# Patient Record
Sex: MALE | Race: WHITE | NOT HISPANIC OR LATINO | Employment: FULL TIME | URBAN - METROPOLITAN AREA
[De-identification: names, ages, dates, MRNs, and addresses within clinical notes are randomized per-mention and may not be internally consistent; named-entity substitution may affect disease eponyms.]

---

## 2017-01-25 ENCOUNTER — TELEPHONE (OUTPATIENT)
Dept: TRANSPLANT | Facility: CLINIC | Age: 32
End: 2017-01-25

## 2017-01-25 DIAGNOSIS — Z00.5 TRANSPLANT DONOR EVALUATION: Primary | ICD-10-CM

## 2017-01-25 NOTE — TELEPHONE ENCOUNTER
"Logged in as: [email or web address]. Logout.  Incomplete   Pending   Registered   Archived Change Log Done Living Kidney Donor Evaluation Completed: 2017 21:08:44 CT Updated: 2017 21:09:10 CT  Donor Name: Bean Huynh MRN: [id number] Note: : 1985 Age: 31Gender: Male Donor Height: 5  10\" Weight (lb): 200 BMI: 28.7  Donor Race: ,  Ethnicity: Not / Donor Preferred Language: English  Required?: No Current Marital Status: Single  Demographics: Home Address: [address] [place] [address] [address] City: [place] State: [place] Zip: [address] Country: [place]  Best Phone: [phone] Alt Phone: Donor Email: Best Phone Type: Mobile Alt Phone Type:   Preferred Contact Time(s): 09:00 AM-11:00 AM, 11:00 AM-1:00 PM, 1:00 PM-4:00 PM Preferred Contact Day(s): Mon, Tue, Wed, Thur, Fri  Donor Screen: PASSED Donor Referred by: Tx Candidate Donor self reported ABO: Unknown  Recipient Information: Recipient Name (Last, First): Janell Torres Recipient :    1988  ... Donor Relationship: Significant other Recipient Diagnosis: Recipient ABO:   MEDICAL HISTORY:  None Reported  MEDICATIONS:  None Reported  SURGICAL HISTORY:  None Reported  ALLERGIES:  NKDA  SOCIAL HISTORY:  EtOH: Occasional (1-2 drinks/week)  Illicit Drug Use: Denies  Tobacco: Remote; Quit ; ( ppd x 7 years)  SELF-REPORTED FUNCTIONAL STATUS:  \"I am able to participate in strenuous sports such as swimming, singles tennis, football, basketball, or skiing\"  Exercise (2 X per week)  REVIEW OF ORGAN SYSTEMS: Airway or Lungs: No Blood Disorder: No Cancer: No Diabetes,Thyroid,Adrenal,Endocrine Disorder: No Digestive or Liver: No Heart or Circulatory System: No Immune Diseases: No Kidneys and Bladder: No Male Health: No Muscles,Bones,Joints: No Neuro: No Psych: No  FAMILY HISTORY: Confirmed:  Cancer (Aunt or Uncle)  Diabetes (Aunt or Uncle, Grandparent)  Heart Disease (Grandparent)  Hypertension (Father, Grandparent, Aunt or " Uncle)  Denied:  Kidney Disease (denies)  Kidney Stones (denies)  DONOR INFORMATION:  Level of Education: Associate's or technical degree complete Employment Status: Full Time Employer: [employer] Medical Insurance Status: Has medical insurance Current Accommodation: Lives in rented accommodation Living Arrangement: With spouse Allow Disclosure to Recipient: Yes Paired Kidney Exchange Education Level: General idea Paired Kidney Exchange Participation Consent: Unsure Donor Motivation: Ready to start evaluation with reservations  HIGH RISK BEHAVIOR:  Blood transfusion < 12 months. (NO)  Commercial sex < 12 months. (NO)  Illicit IV drug use < 5yrs. (NO)  Male:male sexual contact < 5yrs. (NO)  Other high risk sexual contact < 12 months. (NO)  EMERGENCY CONTACT INFORMATION:  Primary Secondary First Name: Elin Last Name: Lino Phone Number: [phone] Relationship: Mother  First Name: Lance  Last Name: Lino Phone Number: [phone] Relationship: Father  REASON FOR DONATION:   I am either blood type O or A according to my mother. I want to be able to know if I am a match in case of an emergency surgery needed for my recipient. Also, in the future if donors are not available, I am willing to donate if I am indeed a match.  PHYSICIAN CONTACT INFORMATION:  PCP  Name: [facility]   City: [place] State: [place]  Phone: [phone]  ADDITIONAL NOTES:   REVIEWED BY:    Leydi  TODAY'S DATE:   01/25/2017  ... Done  I am either blood type O or A according to my mother. I want to be able to know if I am a match in case of an emergency surgery needed for my recipient. Also, in the future if donors are not available, I am willing to donate if I am indeed a match.

## 2017-03-07 ENCOUNTER — DOCUMENTATION ONLY (OUTPATIENT)
Dept: TRANSPLANT | Facility: CLINIC | Age: 32
End: 2017-03-07

## 2017-03-07 DIAGNOSIS — Z00.5 TRANSPLANT DONOR EVALUATION: Primary | ICD-10-CM

## 2017-03-08 ENCOUNTER — ALLIED HEALTH/NURSE VISIT (OUTPATIENT)
Dept: TRANSPLANT | Facility: CLINIC | Age: 32
End: 2017-03-08

## 2017-03-08 VITALS — HEIGHT: 70 IN | WEIGHT: 223.3 LBS | SYSTOLIC BLOOD PRESSURE: 128 MMHG | DIASTOLIC BLOOD PRESSURE: 80 MMHG

## 2017-03-08 DIAGNOSIS — Z00.5 TRANSPLANT DONOR EVALUATION: ICD-10-CM

## 2017-03-08 DIAGNOSIS — Z00.5 TRANSPLANT DONOR EVALUATION: Primary | ICD-10-CM

## 2017-03-08 LAB
ABO + RH BLD: NORMAL
ABO + RH BLD: NORMAL
ALBUMIN UR-MCNC: NEGATIVE MG/DL
APPEARANCE UR: CLEAR
BILIRUB UR QL STRIP: NEGATIVE
COLOR UR AUTO: YELLOW
CREAT SERPL-MCNC: 0.84 MG/DL (ref 0.66–1.25)
CREAT UR-MCNC: 269 MG/DL
GFR SERPL CREATININE-BSD FRML MDRD: NORMAL ML/MIN/1.7M2
GLUCOSE SERPL-MCNC: 98 MG/DL (ref 70–99)
GLUCOSE UR STRIP-MCNC: NEGATIVE MG/DL
HGB BLD-MCNC: 16.7 G/DL (ref 13.3–17.7)
HGB UR QL STRIP: NEGATIVE
KETONES UR STRIP-MCNC: NEGATIVE MG/DL
LEUKOCYTE ESTERASE UR QL STRIP: NEGATIVE
MICROALBUMIN UR-MCNC: 17 MG/L
MICROALBUMIN/CREAT UR: 6.28 MG/G CR (ref 0–17)
MUCOUS THREADS #/AREA URNS LPF: PRESENT /LPF
NITRATE UR QL: NEGATIVE
PH UR STRIP: 6 PH (ref 5–7)
PROT UR-MCNC: 0.23 G/L
PROT/CREAT 24H UR: 0.09 G/G CR (ref 0–0.2)
RBC #/AREA URNS AUTO: 1 /HPF (ref 0–2)
SP GR UR STRIP: 1.02 (ref 1–1.03)
SPECIMEN EXP DATE BLD: NORMAL
URN SPEC COLLECT METH UR: ABNORMAL
UROBILINOGEN UR STRIP-MCNC: 0 MG/DL (ref 0–2)
WBC #/AREA URNS AUTO: <1 /HPF (ref 0–2)

## 2017-03-08 PROCEDURE — 82565 ASSAY OF CREATININE: CPT | Performed by: TRANSPLANT SURGERY

## 2017-03-08 PROCEDURE — 82947 ASSAY GLUCOSE BLOOD QUANT: CPT | Performed by: TRANSPLANT SURGERY

## 2017-03-08 PROCEDURE — 84156 ASSAY OF PROTEIN URINE: CPT | Performed by: TRANSPLANT SURGERY

## 2017-03-08 PROCEDURE — 85018 HEMOGLOBIN: CPT | Performed by: TRANSPLANT SURGERY

## 2017-03-08 PROCEDURE — 81001 URINALYSIS AUTO W/SCOPE: CPT | Performed by: TRANSPLANT SURGERY

## 2017-03-08 PROCEDURE — 82043 UR ALBUMIN QUANTITATIVE: CPT | Performed by: TRANSPLANT SURGERY

## 2017-03-08 PROCEDURE — 36415 COLL VENOUS BLD VENIPUNCTURE: CPT | Performed by: TRANSPLANT SURGERY

## 2017-03-08 PROCEDURE — 86900 BLOOD TYPING SEROLOGIC ABO: CPT | Performed by: TRANSPLANT SURGERY

## 2017-03-08 NOTE — MR AVS SNAPSHOT
"              After Visit Summary   3/8/2017    Bean Huynh    MRN: [id number]           Patient Information     Date Of Birth          1985        Visit Information        Provider Department      3/8/2017 9:30 AM Nurse, [facility] Solid Organ Transplant        Today's Diagnoses     Transplant donor evaluation    -  1       Follow-ups after your visit        Who to contact     If you have questions or need follow up information about today's clinic visit or your schedule please contact [facility] SOLID ORGAN TRANSPLANT directly at [phone].  Normal or non-critical lab and imaging results will be communicated to you by [facility], letter or phone within 4 business days after the clinic has received the results. If you do not hear from us within 7 days, please contact the clinic through [facility] or phone. If you have a critical or abnormal lab result, we will notify you by phone as soon as possible.  Submit refill requests through [employer] or call your pharmacy and they will forward the refill request to us. Please allow 3 business days for your refill to be completed.          Additional Information About Your Visit        MyChart Information     Plurchase lets you send messages to your doctor, view your test results, renew your prescriptions, schedule appointments and more. To sign up, go to [email or web address] . Click on \"Log in\" on the left side of the screen, which will take you to the Welcome page. Then click on \"Sign up Now\" on the right side of the page.     You will be asked to enter the access code listed below, as well as some personal information. Please follow the directions to create your username and password.     Your access code is: [email or web address]  Expires: 2017 11:32 AM     Your access code will  in 90 days. If you need help or a new code, please call your [facility] clinic or [phone].        Care EveryWhere ID     This is your Care EveryWhere ID. This could be used by other " "organizations to access your [facility] medical records  [id number]        Your Vitals Were     Height BMI (Body Mass Index)                1.778 m (5' 10\") 32.04 kg/m2           Blood Pressure from Last 3 Encounters:   03/08/17 128/80   06/28/16 124/82   11/30/15 138/80    Weight from Last 3 Encounters:   03/08/17 101.3 kg (223 lb 4.8 oz)   06/28/16 98.9 kg (218 lb)   11/30/15 100.8 kg (222 lb 3.2 oz)              Today, you had the following     No orders found for display       Primary Care Provider Office Phone # Fax #    Piotr Zabala [phone]       [facility] [address]  [facility] [address]        Thank you!     Thank you for choosing [facility] SOLID ORGAN TRANSPLANT  for your care. Our goal is always to provide you with excellent care. Hearing back from our patients is one way we can continue to improve our services. Please take a few minutes to complete the written survey that you may receive in the mail after your visit with us. Thank you!             Your Updated Medication List - Protect others around you: Learn how to safely use, store and throw away your medicines at www.disposemymeds.org.          This list is accurate as of: 3/8/17 11:32 AM.  Always use your most recent med list.                   Brand Name Dispense Instructions for use    doxycycline 100 MG tablet    VIBRA-TABS    14 tablet    Take 1 tablet (100 mg) by mouth 2 times daily       guaiFENesin-codeine 100-10 MG/5ML Soln solution    ROBITUSSIN AC    120 mL    Take 10 mLs by mouth every 4 hours as needed for cough       * nystatin 041706 UNIT/GM Powd    MYCOSTATIN    30 g    Apply to affected area of groin 2 times daily       * nystatin 010775 UNIT/GM Powd    MYCOSTATIN    60 g    Apply topically 3 times daily as needed       * Notice:  This list has 2 medication(s) that are the same as other medications prescribed for you. Read the directions carefully, and ask your doctor or other care provider " to review them with you.

## 2017-03-09 ENCOUNTER — TELEPHONE (OUTPATIENT)
Dept: TRANSPLANT | Facility: CLINIC | Age: 32
End: 2017-03-09

## 2017-03-09 NOTE — TELEPHONE ENCOUNTER
Left message asking Bean to return call. Phase 1's all OK. Is abo incompat--need to rediscuss PEP.

## 2017-03-09 NOTE — TELEPHONE ENCOUNTER
Informed Bean he's abo incompat. Will now speak with SO/tabby and see if she's interested in doing the PEP.Will contact me either way.

## 2017-04-10 ENCOUNTER — TELEPHONE (OUTPATIENT)
Dept: TRANSPLANT | Facility: CLINIC | Age: 32
End: 2017-04-10

## 2017-04-10 NOTE — TELEPHONE ENCOUNTER
Has decided to do PEP. Will now get sched for eval. [place] left [place] in past 3 months.

## 2017-04-20 DIAGNOSIS — Z00.5 TRANSPLANT DONOR EVALUATION: ICD-10-CM

## 2017-04-27 ENCOUNTER — RADIANT APPOINTMENT (OUTPATIENT)
Dept: CT IMAGING | Facility: CLINIC | Age: 32
End: 2017-04-27
Attending: INTERNAL MEDICINE

## 2017-04-27 ENCOUNTER — ALLIED HEALTH/NURSE VISIT (OUTPATIENT)
Dept: TRANSPLANT | Facility: CLINIC | Age: 32
End: 2017-04-27
Attending: SURGERY

## 2017-04-27 ENCOUNTER — OFFICE VISIT (OUTPATIENT)
Dept: TRANSPLANT | Facility: CLINIC | Age: 32
End: 2017-04-27
Attending: SURGERY

## 2017-04-27 ENCOUNTER — OFFICE VISIT (OUTPATIENT)
Dept: NEPHROLOGY | Facility: CLINIC | Age: 32
End: 2017-04-27
Attending: INTERNAL MEDICINE
Payer: COMMERCIAL

## 2017-04-27 ENCOUNTER — INFUSION THERAPY VISIT (OUTPATIENT)
Dept: INFUSION THERAPY | Facility: CLINIC | Age: 32
End: 2017-04-27
Attending: INTERNAL MEDICINE

## 2017-04-27 VITALS
HEART RATE: 85 BPM | DIASTOLIC BLOOD PRESSURE: 74 MMHG | BODY MASS INDEX: 32.1 KG/M2 | WEIGHT: 224.2 LBS | TEMPERATURE: 98.3 F | RESPIRATION RATE: 16 BRPM | SYSTOLIC BLOOD PRESSURE: 116 MMHG | OXYGEN SATURATION: 97 % | HEIGHT: 70 IN

## 2017-04-27 VITALS — SYSTOLIC BLOOD PRESSURE: 130 MMHG | DIASTOLIC BLOOD PRESSURE: 80 MMHG

## 2017-04-27 VITALS — DIASTOLIC BLOOD PRESSURE: 78 MMHG | SYSTOLIC BLOOD PRESSURE: 128 MMHG

## 2017-04-27 DIAGNOSIS — Z00.5 TRANSPLANT DONOR EVALUATION: ICD-10-CM

## 2017-04-27 DIAGNOSIS — Z00.5 EXAMINATION OF POTENTIAL DONOR OF ORGAN AND TISSUE: Primary | ICD-10-CM

## 2017-04-27 DIAGNOSIS — Z00.5 TRANSPLANT DONOR EVALUATION: Primary | ICD-10-CM

## 2017-04-27 LAB
ABO + RH BLD: NORMAL
ABO + RH BLD: NORMAL
ALBUMIN SERPL-MCNC: 4 G/DL (ref 3.4–5)
ALBUMIN UR-MCNC: NEGATIVE MG/DL
ALP SERPL-CCNC: 70 U/L (ref 40–150)
ALT SERPL W P-5'-P-CCNC: 50 U/L (ref 0–70)
ANION GAP SERPL CALCULATED.3IONS-SCNC: 7 MMOL/L (ref 3–14)
APPEARANCE UR: CLEAR
APTT PPP: 31 SEC (ref 22–37)
AST SERPL W P-5'-P-CCNC: 27 U/L (ref 0–45)
BILIRUB SERPL-MCNC: 1.2 MG/DL (ref 0.2–1.3)
BILIRUB UR QL STRIP: NEGATIVE
BLD GP AB SCN SERPL QL: NORMAL
BLOOD BANK CMNT PATIENT-IMP: NORMAL
BUN SERPL-MCNC: 15 MG/DL (ref 7–30)
CALCIUM SERPL-MCNC: 8.6 MG/DL (ref 8.5–10.1)
CHLORIDE SERPL-SCNC: 104 MMOL/L (ref 94–109)
CHOLEST SERPL-MCNC: 203 MG/DL
CMV IGG SERPL QL IA: 5.7 AI (ref 0–0.8)
CO2 SERPL-SCNC: 26 MMOL/L (ref 20–32)
COLOR UR AUTO: YELLOW
CREAT SERPL-MCNC: 0.88 MG/DL (ref 0.66–1.25)
CREAT UR-MCNC: 295 MG/DL
EBV VCA IGG SER QL IA: 2.8 AI (ref 0–0.8)
EBV VCA IGM SER QL IA: 0.2 AI (ref 0–0.8)
ERYTHROCYTE [DISTWIDTH] IN BLOOD BY AUTOMATED COUNT: 11.9 % (ref 10–15)
GFR SERPL CREATININE-BSD FRML MDRD: NORMAL ML/MIN/1.7M2
GLUCOSE SERPL-MCNC: 88 MG/DL (ref 70–99)
GLUCOSE UR STRIP-MCNC: NEGATIVE MG/DL
HBA1C MFR BLD: 4.8 % (ref 4.3–6)
HBV CORE AB SERPL QL IA: NONREACTIVE
HBV SURFACE AB SERPL IA-ACNC: 54.58 M[IU]/ML
HBV SURFACE AG SERPL QL IA: NONREACTIVE
HCT VFR BLD AUTO: 46.6 % (ref 40–53)
HCV AB SERPL QL IA: NORMAL
HDLC SERPL-MCNC: 41 MG/DL
HGB BLD-MCNC: 16.2 G/DL (ref 13.3–17.7)
HGB UR QL STRIP: NEGATIVE
HIV 1+2 AB+HIV1 P24 AG SERPL QL IA: NORMAL
INR PPP: 0.96 (ref 0.86–1.14)
KETONES UR STRIP-MCNC: NEGATIVE MG/DL
LDLC SERPL CALC-MCNC: 120 MG/DL
LEUKOCYTE ESTERASE UR QL STRIP: NEGATIVE
MCH RBC QN AUTO: 31.9 PG (ref 26.5–33)
MCHC RBC AUTO-ENTMCNC: 34.8 G/DL (ref 31.5–36.5)
MCV RBC AUTO: 92 FL (ref 78–100)
MICROALBUMIN UR-MCNC: 17 MG/L
MICROALBUMIN/CREAT UR: 5.8 MG/G CR (ref 0–17)
MUCOUS THREADS #/AREA URNS LPF: PRESENT /LPF
NITRATE UR QL: NEGATIVE
NONHDLC SERPL-MCNC: 162 MG/DL
PH UR STRIP: 6 PH (ref 5–7)
PHOSPHATE SERPL-MCNC: 2.9 MG/DL (ref 2.5–4.5)
PLATELET # BLD AUTO: 201 10E9/L (ref 150–450)
POTASSIUM SERPL-SCNC: 4.1 MMOL/L (ref 3.4–5.3)
PROT SERPL-MCNC: 7.7 G/DL (ref 6.8–8.8)
PROT UR-MCNC: 0.22 G/L
PROT/CREAT 24H UR: 0.08 G/G CR (ref 0–0.2)
RBC # BLD AUTO: 5.08 10E12/L (ref 4.4–5.9)
RBC #/AREA URNS AUTO: 1 /HPF (ref 0–2)
SODIUM SERPL-SCNC: 136 MMOL/L (ref 133–144)
SP GR UR STRIP: 1.02 (ref 1–1.03)
SPECIMEN EXP DATE BLD: NORMAL
T PALLIDUM IGG+IGM SER QL: NEGATIVE
TRIGL SERPL-MCNC: 211 MG/DL
URATE SERPL-MCNC: 6.6 MG/DL (ref 3.5–7.2)
URN SPEC COLLECT METH UR: ABNORMAL
UROBILINOGEN UR STRIP-MCNC: 0 MG/DL (ref 0–2)
WBC # BLD AUTO: 6.6 10E9/L (ref 4–11)
WBC #/AREA URNS AUTO: 1 /HPF (ref 0–2)

## 2017-04-27 PROCEDURE — 86706 HEP B SURFACE ANTIBODY: CPT | Performed by: INTERNAL MEDICINE

## 2017-04-27 PROCEDURE — 80053 COMPREHEN METABOLIC PANEL: CPT | Performed by: INTERNAL MEDICINE

## 2017-04-27 PROCEDURE — 80061 LIPID PANEL: CPT | Performed by: INTERNAL MEDICINE

## 2017-04-27 PROCEDURE — 84550 ASSAY OF BLOOD/URIC ACID: CPT | Performed by: INTERNAL MEDICINE

## 2017-04-27 PROCEDURE — 86480 TB TEST CELL IMMUN MEASURE: CPT | Performed by: INTERNAL MEDICINE

## 2017-04-27 PROCEDURE — 85027 COMPLETE CBC AUTOMATED: CPT | Performed by: INTERNAL MEDICINE

## 2017-04-27 PROCEDURE — 85610 PROTHROMBIN TIME: CPT | Performed by: INTERNAL MEDICINE

## 2017-04-27 PROCEDURE — 86665 EPSTEIN-BARR CAPSID VCA: CPT | Performed by: INTERNAL MEDICINE

## 2017-04-27 PROCEDURE — 40000866 ZZHCL STATISTIC HIV 1/2 ANTIGEN/ANTIBODY PRETRANSPLANT ONLY: Performed by: INTERNAL MEDICINE

## 2017-04-27 PROCEDURE — 81001 URINALYSIS AUTO W/SCOPE: CPT | Performed by: INTERNAL MEDICINE

## 2017-04-27 PROCEDURE — 85730 THROMBOPLASTIN TIME PARTIAL: CPT | Performed by: INTERNAL MEDICINE

## 2017-04-27 PROCEDURE — 82542 COL CHROMOTOGRAPHY QUAL/QUAN: CPT | Performed by: INTERNAL MEDICINE

## 2017-04-27 PROCEDURE — 86780 TREPONEMA PALLIDUM: CPT | Performed by: INTERNAL MEDICINE

## 2017-04-27 PROCEDURE — 96374 THER/PROPH/DIAG INJ IV PUSH: CPT

## 2017-04-27 PROCEDURE — 84156 ASSAY OF PROTEIN URINE: CPT | Performed by: INTERNAL MEDICINE

## 2017-04-27 PROCEDURE — 83036 HEMOGLOBIN GLYCOSYLATED A1C: CPT | Performed by: INTERNAL MEDICINE

## 2017-04-27 PROCEDURE — 86901 BLOOD TYPING SEROLOGIC RH(D): CPT | Performed by: INTERNAL MEDICINE

## 2017-04-27 PROCEDURE — 84100 ASSAY OF PHOSPHORUS: CPT | Performed by: INTERNAL MEDICINE

## 2017-04-27 PROCEDURE — 86803 HEPATITIS C AB TEST: CPT | Performed by: INTERNAL MEDICINE

## 2017-04-27 PROCEDURE — 86704 HEP B CORE ANTIBODY TOTAL: CPT | Performed by: INTERNAL MEDICINE

## 2017-04-27 PROCEDURE — 86905 BLOOD TYPING RBC ANTIGENS: CPT | Performed by: INTERNAL MEDICINE

## 2017-04-27 PROCEDURE — 25500064 ZZH RX 255 OP 636: Performed by: INTERNAL MEDICINE

## 2017-04-27 PROCEDURE — 87340 HEPATITIS B SURFACE AG IA: CPT | Performed by: INTERNAL MEDICINE

## 2017-04-27 PROCEDURE — 86644 CMV ANTIBODY: CPT | Performed by: INTERNAL MEDICINE

## 2017-04-27 PROCEDURE — 93010 ELECTROCARDIOGRAM REPORT: CPT | Performed by: INTERNAL MEDICINE

## 2017-04-27 PROCEDURE — 36415 COLL VENOUS BLD VENIPUNCTURE: CPT | Performed by: INTERNAL MEDICINE

## 2017-04-27 PROCEDURE — 82043 UR ALBUMIN QUANTITATIVE: CPT | Performed by: INTERNAL MEDICINE

## 2017-04-27 PROCEDURE — 86850 RBC ANTIBODY SCREEN: CPT | Performed by: INTERNAL MEDICINE

## 2017-04-27 PROCEDURE — 86900 BLOOD TYPING SEROLOGIC ABO: CPT | Performed by: INTERNAL MEDICINE

## 2017-04-27 RX ORDER — IOPAMIDOL 755 MG/ML
100 INJECTION, SOLUTION INTRAVASCULAR ONCE
Status: COMPLETED | OUTPATIENT
Start: 2017-04-27 | End: 2017-04-27

## 2017-04-27 RX ADMIN — IOHEXOL 5 ML: 300 INJECTION, SOLUTION INTRAVENOUS at 09:17

## 2017-04-27 RX ADMIN — IOPAMIDOL 100 ML: 755 INJECTION, SOLUTION INTRAVASCULAR at 15:27

## 2017-04-27 ASSESSMENT — PAIN SCALES - GENERAL: PAINLEVEL: NO PAIN (0)

## 2017-04-27 NOTE — LETTER
4/27/2017       RE: Bean Huynh  [address]   [address]  [place] [address]     Dear Colleague,    Thank you for referring your patient, Bean Huynh, to the [facility] NEPHROLOGY at [facility]. Please see a copy of my visit note below.    Assessment and Plan:  1. Prospective kidney transplant donor with one issue that needs to be addressed prior to donation. Patient's blood pressure is acceptable at this visit, kidney function appears to be acceptable with Iohexol pending, and urinalysis is bland.  2. Overweight - patient is above BMI goal of 30 or less.  Recommend goal weight loss of 15 lbs to decrease risk of surgical wound complications and for his overall health.    Discussed the risks of donating a kidney, including the surgical risk and the possible risks of living with one kidney.    Education about expected post-donation kidney function and how chronic kidney disease (CKD) and end stage kidney disease (ESKD) might potentially impact the donor in the future, include, but not limited to:       - On average, donors will have 25-35% permanent loss of kidney function at donation.       - Baseline risk of ESKD may slightly exceed that of members of the general population with the same demographic profile.       - Donor risks must be interpreted in light of known epidemiology of both CKD or ESKD, such as that CKD generally develops in midlife (40-50 years old) and ESKD generally develops after age 60.       - Medical evaluation of young potential donors cannot predict lifetime risk of CKD or ESKD.       - Donors may be at higher risk for CKD if they sustain damage to the remaining kidney.       - Development of CKD and progression of ESKD may be more rapid with only 1 kidney.       - Some type of kidney replacement therapy, either kidney transplant or dialysis, is required when reaching ESKD.    Potential medical or surgical risks include, but not limited  to:       - Death.       - Scars, pain, fatigue, and other consequences typical of any surgical procedure.       - Decreased kidney function.       - Abdominal or bowel symptoms, such as bloating and nausea, and developing bowel obstruction.       - Kidney failure (ESKD) and the need for a kidney transplant or dialysis for the donor.       - Impact of obesity, hypertension, or other donor-specific medical conditions on morbidity and mortality of the potential donor.    Patients overall evaluation will be discussed with the transplant team and a final recommendation on the patients' suitability to be a kidney transplant donor will be made at that time.    Consult:  Bean Huynh was seen in consultation at the request of Dr. Linus Mckeon for evaluation as a potential kidney transplant donor.    Reason for Visit:  Bean Hyunh is a 31 year old male who presents for a kidney donor evaluation.  Patient would like to donate to Janell Torres, his girlfriend.    HPI:    Patient reports feeling good overall with no significant medical complaints.  His energy level is good and pretty much normal.  He is active and does get some exercise.  Denies any chest pain or shortness of breath with exertion.  Appetite is good with no nausea, vomiting or diarrhea.  No fever, sweats or chills.  No leg swelling.         Kidney Disease Hx:       h/o Kidney Problems: No  Family h/o Genetic Kidney Disease: No       h/o HTN: No    Usual BP: Not checked       h/o Protein in Urine: No  h/o Blood in Urine: No       h/o Kidney Stones: No  h/o Kidney Injury: No       h/o Recurrent UTI: No  h/o Genitourinary Problems: No       h/o Chronic NSAID Use: No         Other Medical Hx:       h/o DM: No   h/o Gestational DM: Not applicable       h/o Preeclampsia: Not applicable          h/o Gastrointestinal, Pancreas or Liver Problems: No       h/o Lung or Heart Problems: No       h/o Hematologic Problems: No  h/o Bleeding or Clotting Problems: No        h/o Cancer: No       h/o Infection Problems: No         Skin Cancer Risk:       h/o more than 50 moles: No       h/o extensive sun exposure: No       h/o melanoma: No       Family h/o melanoma: No         Mental Health Assessment:       h/o Depression: No       h/o Psychiatric Illness: No       h/o Suicidal Attempt(s): No    ROS:   A comprehensive review of systems was obtained and negative, except as noted in the HPI or PMH.    PMH:   History was taken from the patient as noted below.  Past Medical History:   Diagnosis Date     NO ACTIVE PROBLEMS        PSH:   Past Surgical History:   Procedure Laterality Date     s/p wisdom teeth extraction       Personal or family history of anesthesia problems: No    Family Hx:   Family History   Problem Relation Age of Onset     Hypertension Father      HEART DISEASE Maternal Grandmother      DIABETES Paternal Grandfather      DIABETES Paternal Aunt      Colon Cancer Maternal Uncle           Specific Family Hx:       FH of DM: No       FH of CAD: No  FH of HTN: Yes        FH of Cancer: No  FH of Kidney Cancer: No    Personal Hx:   Social History     Social History     Marital status: Single     Spouse name: N/A     Number of children: 0     Years of education: N/A     Occupational History     Not on file.     Social History Main Topics     Smoking status: Never Smoker     Smokeless tobacco: Never Used     Alcohol use 3.0 oz/week     5 Standard drinks or equivalent per week      Comment: social     Drug use: No     Sexual activity: Yes     Partners: Female     Birth control/ protection: Condom     Other Topics Concern     Parent/Sibling W/ Cabg, Mi Or Angioplasty Before 65f 55m? No     Social History Narrative          Specific Social Hx:       Health Insurance Status: Yes       Employment Status: Full time  Occupation: Senior engineering tech                       Living Arrangements: Lives with significant other       Social Support: Yes       Presence of increased risk for  "disease transmission behaviors as defined by [facility] guidelines: No        Allergies:  No Known Allergies    Medications:  Prior to Admission medications    Not on File       Vitals:  Vital Signs 4/27/2017 4/27/2017 4/27/2017   Systolic 116 130 128   Diastolic 74 80 78   Pulse 85 - -   Temperature 98.3 - -   Respirations 16 - -   Weight (LB) 224 lb 3.2 oz - -   Height 5' 10\" - -   BMI (Calculated) 32.24 - -   Pain - - -   O2 97 - -       Exam:   GENERAL APPEARANCE: alert and no distress  HENT: mouth without ulcers or lesions  LYMPHATICS: no cervical or supraclavicular nodes  RESP: lungs clear to auscultation - no rales, rhonchi or wheezes  CV: regular rhythm, normal rate, no rub, no murmur  EDEMA: no LE edema bilaterally  ABDOMEN: soft, nondistended, nontender, bowel sounds normal  MS: extremities normal - no gross deformities noted, no evidence of inflammation in joints, no muscle tenderness  SKIN: no rash    Results:   Labs and imaging were ordered for this visit and reviewed by me.  Recent Results (from the past 336 hour(s))   EKG 12-lead complete w/read - Clinics    Collection Time: 04/27/17  8:41 AM   Result Value Ref Range    Interpretation ECG Click View Image link to view waveform and result    ABO/Rh type and screen    Collection Time: 04/27/17  8:45 AM   Result Value Ref Range    ABO A     RH(D)  Neg     Antibody Screen Neg     Test Valid Only At       [facility]    Specimen Expires 04/30/2017    Hepatitis B core antibody    Collection Time: 04/27/17  8:45 AM   Result Value Ref Range    Hepatitis B Core Fabby Nonreactive NR   Hepatitis B Surface Antibody    Collection Time: 04/27/17  8:45 AM   Result Value Ref Range    Hepatitis B Surface Antibody 54.58 (H) <8.00 m[IU]/mL   Hepatitis B surface antigen    Collection Time: 04/27/17  8:45 AM   Result Value Ref Range    Hep B Surface Agn Nonreactive NR   Hepatitis C antibody    Collection Time: 04/27/17  8:45 AM   Result " Value Ref Range    Hepatitis C Antibody  NR     Nonreactive   Assay performance characteristics have not been established for newborns,   infants, and children     HIV Antigen Antibody Combo Pretransplant    Collection Time: 04/27/17  8:45 AM   Result Value Ref Range    HIV Antigen Antibody Combo Pretransplant  NR     Nonreactive   HIV-1 p24 Ag & HIV-1/HIV-2 Ab Not Detected     Anti Treponema    Collection Time: 04/27/17  8:45 AM   Result Value Ref Range    Treponema pallidum Antibody Negative NEG   ABO Subtyping [[id number]]    Collection Time: 04/27/17  8:45 AM   Result Value Ref Range    Antigen Type A1 Negative    Lipid Profile    Collection Time: 04/27/17  8:45 AM   Result Value Ref Range    Cholesterol 203 (H) <200 mg/dL    Triglycerides 211 (H) <150 mg/dL    HDL Cholesterol 41 >39 mg/dL    LDL Cholesterol Calculated 120 (H) <100 mg/dL    Non HDL Cholesterol 162 (H) <130 mg/dL   Comprehensive metabolic panel    Collection Time: 04/27/17  8:45 AM   Result Value Ref Range    Sodium 136 133 - 144 mmol/L    Potassium 4.1 3.4 - 5.3 mmol/L    Chloride 104 94 - 109 mmol/L    Carbon Dioxide 26 20 - 32 mmol/L    Anion Gap 7 3 - 14 mmol/L    Glucose 88 70 - 99 mg/dL    Urea Nitrogen 15 7 - 30 mg/dL    Creatinine 0.88 0.66 - 1.25 mg/dL    GFR Estimate >90  Non  GFR Calc   >60 mL/min/1.7m2    GFR Estimate If Black >90   GFR Calc   >60 mL/min/1.7m2    Calcium 8.6 8.5 - 10.1 mg/dL    Bilirubin Total 1.2 0.2 - 1.3 mg/dL    Albumin 4.0 3.4 - 5.0 g/dL    Protein Total 7.7 6.8 - 8.8 g/dL    Alkaline Phosphatase 70 40 - 150 U/L    ALT 50 0 - 70 U/L    AST 27 0 - 45 U/L   Phosphorus    Collection Time: 04/27/17  8:45 AM   Result Value Ref Range    Phosphorus 2.9 2.5 - 4.5 mg/dL   Hemoglobin A1c    Collection Time: 04/27/17  8:45 AM   Result Value Ref Range    Hemoglobin A1C 4.8 4.3 - 6.0 %   INR    Collection Time: 04/27/17  8:45 AM   Result Value Ref Range    INR 0.96 0.86 - 1.14   Partial  thromboplastin time    Collection Time: 04/27/17  8:45 AM   Result Value Ref Range    PTT 31 22 - 37 sec   CBC with platelets    Collection Time: 04/27/17  8:45 AM   Result Value Ref Range    WBC 6.6 4.0 - 11.0 10e9/L    RBC Count 5.08 4.4 - 5.9 10e12/L    Hemoglobin 16.2 13.3 - 17.7 g/dL    Hematocrit 46.6 40.0 - 53.0 %    MCV 92 78 - 100 fl    MCH 31.9 26.5 - 33.0 pg    MCHC 34.8 31.5 - 36.5 g/dL    RDW 11.9 10.0 - 15.0 %    Platelet Count 201 150 - 450 10e9/L   CMV Antibody IgG    Collection Time: 04/27/17  8:45 AM   Result Value Ref Range    CMV Antibody IgG 5.7 (H) 0.0 - 0.8 AI   EBV Capsid Antibody IgG    Collection Time: 04/27/17  8:45 AM   Result Value Ref Range    EBV Capsid Antibody IgG 2.8 (H) 0.0 - 0.8 AI   EBV Capsid Antibody IgM    Collection Time: 04/27/17  8:45 AM   Result Value Ref Range    EBV Capsid Antibody IgM 0.2 0.0 - 0.8 AI   M Tuberculosis by Quantiferon    Collection Time: 04/27/17  8:45 AM   Result Value Ref Range    M Tuberculosis Result Negative NEG    M Tuberculosis Antigen Value 0.02 IU/mL   Uric acid    Collection Time: 04/27/17  8:45 AM   Result Value Ref Range    Uric Acid 6.6 3.5 - 7.2 mg/dL   Blood Group A Subtype    Collection Time: 04/27/17  8:45 AM   Result Value Ref Range    Antigen Type Canceled, Test credited     Blood Bank Comment Duplicate request  4/27/17 1002 IF      Routine UA with microscopic    Collection Time: 04/27/17  8:48 AM   Result Value Ref Range    Color Urine Yellow     Appearance Urine Clear     Glucose Urine Negative NEG mg/dL    Bilirubin Urine Negative NEG    Ketones Urine Negative NEG mg/dL    Specific Gravity Urine 1.025 1.003 - 1.035    Blood Urine Negative NEG    pH Urine 6.0 5.0 - 7.0 pH    Protein Albumin Urine Negative NEG mg/dL    Urobilinogen mg/dL 0.0 0.0 - 2.0 mg/dL    Nitrite Urine Negative NEG    Leukocyte Esterase Urine Negative NEG    Source Midstream Urine     WBC Urine 1 0 - 2 /HPF    RBC Urine 1 0 - 2 /HPF    Mucous Urine Present (A) NEG  /LPF   Microalbumin quantitative random urine    Collection Time: 04/27/17  8:48 AM   Result Value Ref Range    Creatinine Urine 295 mg/dL    Albumin Urine mg/L 17 mg/L    Albumin Urine mg/g Cr 5.80 0 - 17 mg/g Cr   Protein  random urine    Collection Time: 04/27/17  8:48 AM   Result Value Ref Range    Protein Random Urine 0.22 g/L    Protein Total Urine g/gr Creatinine 0.08 0 - 0.2 g/g Cr   CT Renal angio    Collection Time: 04/27/17  3:37 PM   Result Value Ref Range    Radiologist flags Lesion in the left iliac body/wing, recommend MRI              Again, thank you for allowing me to participate in the care of your patient.      Sincerely,    Jose Perez MD

## 2017-04-27 NOTE — LETTER
4/27/2017       RE: Bean Huynh  [address]   [address]  [place] [address]     Dear Colleague,    Thank you for referring your patient, Bean Huynh, to the [facility] SOLID ORGAN TRANSPLANT [facility]. Please see a copy of my visit note below.    RE: Bean Huynh  [facility] #[id number]             I saw your patient, Bean Huynh, in consultation in our pretransplant clinic.  He was here at clinic for evaluation as a possible kidney donor to his girlfriend.  He had not yet seen our donor video.  Our donor coordinator shared our center-specific results with him.  We discussed:    (1) The risks of donation--including mortality (0.03% risk) and morbidity (approximately 1% risk of major morbidity).  We discussed the major reported causes of death after kidney donation (bleeding, infection, pulmonary embolism).  We discussed potential complications:  bleeding requiring reoperation, infection requiring reoperation, pneumonia, bowel obstruction, infection at the site of the incision, hernia at the site of the incision, deep vein thrombosis, deep vein thrombosis with associated pulmonary embolism, and urinary tract infection.  I told her I could not possibly name all of the risks, but that she was at risk for any complications that could occur in any operation.     We also discussed the long-term risks of living with one kidney.  We discussed the rare diseases that might affect only one kidney.  We talked about the new publications suggesting slightly increased long-term risk of ESRD after donor nephrectomy.  For people his age, there is really no data to show what the outcome will be at 50-60 years.  This is an important consideration.  We discussed the rare diseases that might affect having only one kidney.    (2) The hospital stay and the fact that if all goes well, discharge would occur within two to three days after donor nephrectomy.  We also discussed  the fact that there would be no diet or fluid restrictions (again if all went well), and that the only new medication that he would be on would be pain medication.  We discussed the fact that most patients are on Tylenol or something similar within five to six days of surgery.      (3) The recovery time after surgery and the restrictions that are necessary: a) no driving for a couple of weeks (or until he felt that his/her reaction would be adequate if he had to slam on the brakes; and b) no lifting over 10 pounds or any exercise that would stretch his abdominal muscles for six weeks (to allow the muscles to heal so that he doesn't develop a hernia).  We also discussed return to work, which could occur in approximately three to four weeks if he had a desk job or would require not returning to work for six weeks if the job required any heavy lifting or exercise.  We discussed the potential for not getting his pep and energy back for anywhere from two to six weeks after surgery and how there was a tremendous individual variation in return of full energy level.    (4) The concern about long distance travel for the first couple of weeks post-donation.  We discussed the risks of deep vein thrombosis associated with plane or car travel.  I recommended that, if flying, he should get up and walk around every 30-40 minutes; if driving he should ask the  to stop the car every 30-45 minutes so Mr. Huynh could take a short walk.    (5) The fact that the recipient could be on a waiting for  donor transplant and would ultimately receive a kidney if Mr. Huynh did not donate.      (6) The difference between nondirected and directed donors.  I explained our policy of allocation of nondirected kidneys to the number one person on the list.  I also explained that by being a nondirected donor, they would not have the opportunity to meet the recipient (at least for the first six months) and, therefore, would not have  "the opportunity to see the potential \"benefit\" of the donation.  I also mentioned that our recipients often have not sent \"thank-you\" letters to their nondirected donors.  This would be an important thing to recognize as he went forward in thinking about nondirected donation.  Finally, we discussed the option of entering the paired exchange pool--its advantage, increasing the number of transplants from the donation; the disadvantage, harder to schedule and more logistics.    Finally, he has a window in which he wants to donate. However, the transplant will be a paired exchange. I spent some time talking to him about the logistics may preclude him using the specific window he was interested in.    I attempted to answer any remaining questions.  I also told him that should he have any questions, he should feel free to contact us.  We would be glad to answer any questions either over the phone or at another clinic visit.  His transplant coordinator is Mercy Ventura and may be reached at [phone].  Thank you for the opportunity to see him.    I spent 30 minutes with this patient.  Over 95% of that time was spent in counseling and coordination of care.             Yours truly,               Rasta Andrews MD         Professor of Surgery         ([phone])      AJM/st    Again, thank you for allowing me to participate in the care of your patient.      Sincerely,    Rasta Andrews MD      "

## 2017-04-27 NOTE — MR AVS SNAPSHOT
After Visit Summary   4/27/2017    Bean Huynh    MRN: [id number]           Patient Information     Date Of Birth          1985        Visit Information        Provider Department      4/27/2017 11:00 AM Parvin Ventura RN [facility] Solid Organ Transplant        Today's Diagnoses     Examination of potential donor of organ and tissue    -  1       Follow-ups after your visit        Your next 10 appointments already scheduled     Apr 27, 2017  2:00 PM CDT   (Arrive by 1:30 PM)   Kidney Donor Evaluation with Jose Perez MD   [facility] ([facility])    [address]  3rd Floor  [place] [address] [phone]            Apr 27, 2017  3:00 PM CDT   (Arrive by 2:45 PM)   CT RENAL ANGIO with UCCT1   [facility] CT ([facility])    [address]  1st Floor  [place] [address] [phone]           Please bring any scans or X-rays taken at other hospitals, if similar tests were done. Also bring a list of your medicines, including vitamins, minerals and over-the-counter drugs. It is safest to leave personal items at home.  Be sure to tell your doctor:   If you have any allergies.   If there s any chance you are pregnant.   If you are breastfeeding.   If you have any special needs.  You will have contrast for this exam. To prepare:   Do not eat or drink for 2 hours before your exam. If you need to take medicine, you may take it with small sips of water. (We may ask you to take liquid medicine as well.)   The day before your exam, drink extra fluids at least six 8-ounce glasses (unless your doctor tells you to restrict your fluids).  Patients over 70 or patients with diabetes or kidney problems:   If you haven t had a blood test (creatinine test) within the last 30 days, go to your clinic or Diagnostic Imaging Department for this test.  If you have diabetes:   If your kidney function is  normal, continue taking your metformin (Avandamet, Glucophage, Glucovance, Metaglip) on the day of your exam.   If your kidney function is abnormal, wait 48 hours before restarting this medicine.  Please wear loose clothing, such as a sweat suit or jogging clothes. Avoid snaps, zippers and other metal. We may ask you to undress and put on a hospital gown.  If you have any questions, please call the Imaging Department where you will have your exam.            Apr 27, 2017  3:45 PM CDT   (Arrive by 3:30 PM)   XR CHEST 2 VIEWS with UCXR1   [facility] ([facility])    [address]  1st Floor  [place] [address] [phone]           Please bring a list of your current medicines to your exam. (Include vitamins, minerals and over-thecounter medicines.) Leave your valuables at home.  Tell your doctor if there is a chance you may be pregnant.  You do not need to do anything special for this exam.              Who to contact     If you have questions or need follow up information about today's clinic visit or your schedule please contact [facility] SOLID ORGAN TRANSPLANT directly at [phone].  Normal or non-critical lab and imaging results will be communicated to you by Evinohart, letter or phone within 4 business days after the clinic has received the results. If you do not hear from us within 7 days, please contact the clinic through Evinohart or phone. If you have a critical or abnormal lab result, we will notify you by phone as soon as possible.  Submit refill requests through Brightstorm or call your pharmacy and they will forward the refill request to us. Please allow 3 business days for your refill to be completed.          Additional Information About Your Visit        Brightstorm Information     Brightstorm lets you send messages to your doctor, view your test results, renew your prescriptions, schedule appointments and more. To sign up, go to [email or web address] .  "Click on \"Log in\" on the left side of the screen, which will take you to the Welcome page. Then click on \"Sign up Now\" on the right side of the page.     You will be asked to enter the access code listed below, as well as some personal information. Please follow the directions to create your username and password.     Your access code is: [email or web address]  Expires: 2017 12:32 PM     Your access code will  in 90 days. If you need help or a new code, please call your [facility] or [phone].        Care EveryWhere ID     This is your Care EveryWhere ID. This could be used by other organizations to access your [place] medical records  [id number]         Blood Pressure from Last 3 Encounters:   17 128/78   17 116/74   17 128/80    Weight from Last 3 Encounters:   17 101.7 kg (224 lb 3.2 oz)   17 101.3 kg (223 lb 4.8 oz)   16 98.9 kg (218 lb)              Today, you had the following     No orders found for display         Today's Medication Changes          These changes are accurate as of: 17  1:44 PM.  If you have any questions, ask your nurse or doctor.               Stop taking these medicines if you haven't already. Please contact your care team if you have questions.     doxycycline 100 MG tablet   Commonly known as:  VIBRA-TABS           guaiFENesin-codeine 100-10 MG/5ML Soln solution   Commonly known as:  ROBITUSSIN AC           nystatin 804333 UNIT/GM Powd   Commonly known as:  MYCOSTATIN                    Primary Care Provider Office Phone # Fax #    Piotr Zabala [phone]       [facility] [address]  [place] [facility] [address]        Thank you!     Thank you for choosing [facility] SOLID ORGAN TRANSPLANT  for your care. Our goal is always to provide you with excellent care. Hearing back from our patients is one way we can continue to improve our services. Please take a few minutes to complete the written survey that " you may receive in the mail after your visit with us. Thank you!             Your Updated Medication List - Protect others around you: Learn how to safely use, store and throw away your medicines at www.disposemymeds.org.      Notice  As of 4/27/2017  1:44 PM    You have not been prescribed any medications.

## 2017-04-27 NOTE — DISCHARGE INSTRUCTIONS

## 2017-04-27 NOTE — PROGRESS NOTES
"Saw Bean \"Nj\"  in clinic during their kidney donor evaluation.    Came in alone on Thursday, April 27, 2017.    Has offered to be donor to his girlfriend, Janell Torres.  Discussed surgery hospitalization and recovery.  Patient knows when and how to obtain evaluation results, and the process for scheduling surgery.  Reviewed SRTR datasheet.  Signed consent for donor evaluation.  Donor Signed CARA.    Reviewed details pertaining to the Paired Exchange programs.      *[place] for Paired donation  *[employer]OS KPD Program  *Internal Exchange   *National Kidney Registry    Matching Procedure and Logistics    Reviewed that donors and candidates do not choose their match and that they may decline a match. Explained examples of potential chain/swap scenarios and demonstrated how more than one candidate can receive a transplant in these exchanges.    Reviewed that the KPD waiting time is unknown, the intended recipient's antibody panel and reviewed their ABO match power.   Frequent lab draws are necessary in the KPD programs. APD and NKR will send a kit when we initiate the listing to store the blood by freezing the sample and use it for exploratory crossmatches. Once a match offer is made, a fresh blood sample will be needed for the final confirmatory compatibility testing, as well as infectious disease testing.  I reviewed billing policy for the donor evaluation and for the KPD program.  Bridging   KPD programs may need to have the donor wait a period of time after the recipient receives their kidney, in order to determine matching and logistics for the next cluster of a chain. If donor consents to be a bridge donor, the time waiting for the swap cluster to take place is unknown. The donor stated that they are unsure to being a bridge donor if needed. Further blood work may be needed if they are waiting to bridge one year after the initial evaluation.  Unexpected Outcome  I discussed possibility of an unexpected event on the " day of transplant.  I explained how their donated kidney would be backed up here locally as well as in the destination city in the event that the matched recipient is unable to receive the donated kidney.   I reviewed the KPD programs remedy for failed KPD exchanges, and the remedy does not include any additional priority for the paired candidate on the  donor waiting list. The outcome of the matched recipient may be different from the intended recipient's outcome.  Shipping  The kidney's are transported to the matched recipient's in the exchanges via an approved  service to the airport and then flown fixed wing to the intended destination.GPS devices are used in all the NKR exchanges for close monitoring. Risk included in shipping include damage or loss related to unforseen situations; car crash, airplane crash, terroristic events, ect.      Communication  Donors can communicate with the recipient by giving all correspondence to the Transplant Coordinator, omitting all personal identifiers. The Transplant Coordinator will review and deliver to the recipients Coordinator.  Rights  Donors has a right to withdraw from participation in the KPD program at any time..     Donor has signed consent for:  *National Kidney Registry  *[employer] Network for Organ Sharing, KPD  *[place] for Paired Donation  *[facility] Internal Exchange  *Shipping Risks  *Release of Information form used for sharing evaluation clinical data to recipient transplant center.    Has denied being a bridge donor at this time due to work schedule. He can only donate from December through May.  Questions answered.     Living Kidney Donor Consent per OPTN Policy for Transplant Coordinators    Written assurance has been obtained from the patient that the donor:   1) Is willing to donate  2) Is free from inducement and coercion  3) Has been informed that the donor may decline to donate at any time  4) Has been informed that transplant centers must:      A) Offer donors an opportunity to discontinue the donor consent or evaluation process in a way that is protected and confidential    B) Provide an independent donor advocate (ROSARIO) to assist the potential donor during this process    The following was presented in a language in which donor is able to engage in meaningful dialogue and was reviewed and discussed with the patient by the transplant coordinator and the physician.     The following has been provided:   Education and instruction about all phases of the living donation process includin) Consent  2) The donor must undergo medical and psychosocial evaluations  3) Disclosure that the recovery hospital will take all reasonable precautions to provide confidentiality for the donor/recipient  4) Disclosure that it is a federal crime for any person to knowingly acquire, obtain or otherwise transfer any human organ for valuable consideration  5) The transplant hospital may refuse the potential donor, and the donor could be evaluated by another transplant program with different selection criteria  6) Data from the most recent SRTR center-specific reports:     A) National 1-year patient and graft survival rates    B) Hospital s 1-year patient and graft survival rates    C) Notification about all CMS outcome requirements not being met by the recovery and recipient s hospitals    The following has been provided:   1) Education about expected post-donation kidney function and how chronic kidney disease and end-stage renal disease might potentially impact the donor in the future to include:    A) On average, donors will have 25-35% permanent loss of kidney function at donation    B) Baseline risk of End Stage Renal Disease (ESRD)  does not exceed that of members of general population with same demographic profile    C) Donor risks must be interpreted in light of known epidemiology of both Chronic Kidney Disease (CKD) or ESRD    D) CKD generally develops in  midlife (40-50 years old)    E) ESRD generally develops after age 60    F) Medical evaluation of young potential donor cannot predict lifetime risk  2) Donors may be at higher risk for CKD if they sustain damage to the remaining kidney. 3) Development of CKD and progression to ESRD may be more rapid with only 1 kidney  4) Dialysis is required when reaching ESRD  5) Current practice prioritizes prior living kidney donors who became kidney transplant candidates  6) Alternate procedures or courses of treatment for the recipient, including  donor transplant    A) A  donor kidney may become available for the recipient before donor evaluation is complete or transplant occurs    B) Any transplant candidate may have risk factors for increased morbidity or mortality that are not disclosed to the potential donor  7) The patient will receive a thorough medical and psychosocial evaluation  8) Health information obtained during the evaluation is subject to the same regulations as all records and could reveal conditions that must be reported to local, state, or federal public health authorities  9) The [facility], is required to report living donor follow up information at 6, 12, and 24 months  10) Potential donors must commit to post operative follow up testing coordinated by the [facility]  11) Any infectious disease or malignancy pertinent to acute recipient care discovered during the potential donor s first two years of follow up care:    A) Will be disclosed to the donor    B) May need to be reported to local, state or federal public health authorities    C) Will be disclosed to their recipient s transplant center, and    D) Will be reported through the OPTN Improving Patient Safety Portal    Disclosure has been provided that these risks may be transient or permanent & include but are not limited to potential medical or surgical risks:    Death    Scars, pain, fatigue, and other  consequences typical of any surgical procedure    Decreased kidney function    Abdominal or bowel symptoms such as bloating and nausea, and developing bowel obstruction    Kidney failure and the need for dialysis or kidney transplant for the donor  Impact of obesity, hypertension or other donor-specific medical conditions on morbidity and mortality of the potential donor      Requested routine cancer screening tests: None, potential donor does not meet age requirements.   Approx. time spent:  30 minutes  Donor has medical insurance:  Yes      Mercy Ventura RN, BSN  Transplant Coordinator-Living Donor  [facility]  Office [phone]  Fax [phone]

## 2017-04-27 NOTE — MR AVS SNAPSHOT
"              After Visit Summary   2017    Bean Huynh    MRN: [id number]           Patient Information     Date Of Birth          1985        Visit Information        Provider Department      2017 2:00 PM Jose Perez MD [facility]        Today's Diagnoses     Transplant donor evaluation           Follow-ups after your visit        Who to contact     If you have questions or need follow up information about today's clinic visit or your schedule please contact [facility] NEPH[facility] directly at [phone].  Normal or non-critical lab and imaging results will be communicated to you by MyChart, letter or phone within 4 business days after the clinic has received the results. If you do not hear from us within 7 days, please contact the clinic through Syncanohart or phone. If you have a critical or abnormal lab result, we will notify you by phone as soon as possible.  Submit refill requests through Everything Club or call your pharmacy and they will forward the refill request to us. Please allow 3 business days for your refill to be completed.          Additional Information About Your Visit        Syncanohart Information     Everything Club lets you send messages to your doctor, view your test results, renew your prescriptions, schedule appointments and more. To sign up, go to [email or web address] . Click on \"Log in\" on the left side of the screen, which will take you to the Welcome page. Then click on \"Sign up Now\" on the right side of the page.     You will be asked to enter the access code listed below, as well as some personal information. Please follow the directions to create your username and password.     Your access code is: [email or web address]  Expires: 2017 12:32 PM     Your access code will  in 90 days. If you need help or a new code, please call your [place] clinic or [phone].        Care EveryWhere ID     This is your Care EveryWhere ID. This could be used by other organizations " to access your [place] medical records  [id number]         Blood Pressure from Last 3 Encounters:   04/27/17 130/80   04/27/17 128/78   04/27/17 116/74    Weight from Last 3 Encounters:   04/27/17 101.7 kg (224 lb 3.2 oz)   03/08/17 101.3 kg (223 lb 4.8 oz)   06/28/16 98.9 kg (218 lb)              We Performed the Following     Transplant Nephrologist          Today's Medication Changes          These changes are accurate as of: 4/27/17 11:59 PM.  If you have any questions, ask your nurse or doctor.               Stop taking these medicines if you haven't already. Please contact your care team if you have questions.     doxycycline 100 MG tablet   Commonly known as:  VIBRA-TABS           guaiFENesin-codeine 100-10 MG/5ML Soln solution   Commonly known as:  ROBITUSSIN AC           nystatin [id number] UNIT/GM Powd   Commonly known as:  MYCOSTATIN                    Primary Care Provider Office Phone # Fax #    Piotr Zabala [phone]       [facility] [address] [address]        Thank you!     Thank you for choosing [facility] NEPHROLOGY  for your care. Our goal is always to provide you with excellent care. Hearing back from our patients is one way we can continue to improve our services. Please take a few minutes to complete the written survey that you may receive in the mail after your visit with us. Thank you!             Your Updated Medication List - Protect others around you: Learn how to safely use, store and throw away your medicines at www.disposemymeds.org.      Notice  As of 4/27/2017 11:59 PM    You have not been prescribed any medications.

## 2017-04-27 NOTE — MR AVS SNAPSHOT
After Visit Summary   4/27/2017    Bean Huynh    MRN: [id number]           Patient Information     Date Of Birth          1985        Visit Information        Provider Department      4/27/2017 8:15 AM  TXC EVALUATION [facility] Solid Organ Transplant        Today's Diagnoses     Transplant donor evaluation    -  1       Follow-ups after your visit        Your next 10 appointments already scheduled     Apr 27, 2017  3:00 PM CDT   (Arrive by 2:45 PM)   CT RENAL ANGIO with UCCT1   [facility] Imaging Center CT ([facility])    [address] [address] [phone]           Please bring any scans or X-rays taken at other hospitals, if similar tests were done. Also bring a list of your medicines, including vitamins, minerals and over-the-counter drugs. It is safest to leave personal items at home.  Be sure to tell your doctor:   If you have any allergies.   If there s any chance you are pregnant.   If you are breastfeeding.   If you have any special needs.  You will have contrast for this exam. To prepare:   Do not eat or drink for 2 hours before your exam. If you need to take medicine, you may take it with small sips of water. (We may ask you to take liquid medicine as well.)   The day before your exam, drink extra fluids at least six 8-ounce glasses (unless your doctor tells you to restrict your fluids).  Patients over 70 or patients with diabetes or kidney problems:   If you haven t had a blood test (creatinine test) within the last 30 days, go to your clinic or Diagnostic Imaging Department for this test.  If you have diabetes:   If your kidney function is normal, continue taking your metformin (Avandamet, Glucophage, Glucovance, Metaglip) on the day of your exam.   If your kidney function is abnormal, wait 48 hours before restarting this medicine.  Please wear loose clothing, such as a sweat suit or jogging clothes. Avoid snaps,  "zippers and other metal. We may ask you to undress and put on a hospital gown.  If you have any questions, please call the Imaging Department where you will have your exam.            Apr 27, 2017  3:45 PM CDT   (Arrive by 3:30 PM)   XR CHEST 2 VIEWS with UCXR1   [facility] Imaging Center Xray ([facility] Clinics and Surgery Center)    [address] [address] [phone]           Please bring a list of your current medicines to your exam. (Include vitamins, minerals and over-thecounter medicines.) Leave your valuables at home.  Tell your doctor if there is a chance you may be pregnant.  You do not need to do anything special for this exam.              Who to contact     If you have questions or need follow up information about today's clinic visit or your schedule please contact [facility] SOLID ORGAN TRANSPLANT directly at [phone].  Normal or non-critical lab and imaging results will be communicated to you by Spryhart, letter or phone within 4 business days after the clinic has received the results. If you do not hear from us within 7 days, please contact the clinic through EdgeSpringt or phone. If you have a critical or abnormal lab result, we will notify you by phone as soon as possible.  Submit refill requests through United Preference or call your pharmacy and they will forward the refill request to us. Please allow 3 business days for your refill to be completed.          Additional Information About Your Visit        United Preference Information     United Preference lets you send messages to your doctor, view your test results, renew your prescriptions, schedule appointments and more. To sign up, go to [email or web address] . Click on \"Log in\" on the left side of the screen, which will take you to the Welcome page. Then click on \"Sign up Now\" on the right side of the page.     You will be asked to enter the access code listed below, as well as some personal information. Please follow the directions " "to create your username and password.     Your access code is: [email or web address]  Expires: 2017 12:32 PM     Your access code will  in 90 days. If you need help or a new code, please call your [place] clinic or [phone].        Care EveryWhere ID     This is your Care EveryWhere ID. This could be used by other organizations to access your [place] medical records  [id number]        Your Vitals Were     Pulse Temperature Respirations Height Pulse Oximetry BMI (Body Mass Index)    85 98.3  F (36.8  C) (Oral) 16 1.778 m (5' 10\") 97% 32.17 kg/m2       Blood Pressure from Last 3 Encounters:   17 130/80   17 128/78   17 116/74    Weight from Last 3 Encounters:   17 101.7 kg (224 lb 3.2 oz)   17 101.3 kg (223 lb 4.8 oz)   16 98.9 kg (218 lb)              Today, you had the following     No orders found for display         Today's Medication Changes          These changes are accurate as of: 17  2:19 PM.  If you have any questions, ask your nurse or doctor.               Stop taking these medicines if you haven't already. Please contact your care team if you have questions.     doxycycline 100 MG tablet   Commonly known as:  VIBRA-TABS           guaiFENesin-codeine 100-10 MG/5ML Soln solution   Commonly known as:  ROBITUSSIN AC           nystatin 102733 UNIT/GM Powd   Commonly known as:  MYCOSTATIN                    Primary Care Provider Office Phone # Fax #    Piotr Zabala [phone]       [facility] [address] [address] [facility] [address]        Thank you!     Thank you for choosing [facility] SOLID ORGAN TRANSPLANT  for your care. Our goal is always to provide you with excellent care. Hearing back from our patients is one way we can continue to improve our services. Please take a few minutes to complete the written survey that you may receive in the mail after your visit with us. Thank you!             Your Updated Medication List - " Protect others around you: Learn how to safely use, store and throw away your medicines at www.disposemymeds.org.      Notice  As of 4/27/2017  2:19 PM    You have not been prescribed any medications.

## 2017-04-27 NOTE — PROGRESS NOTES
NUTRITION KIDNEY DONOR EVALUATION  Medical Nutrition Therapy    Weight and BMI:  Current BMI: 32.17  BMI is outside criteria for kidney donation  BMI Goal <30  Current Weight: 224 lbs  Goal Weight <208 lbs  WEIGHT TO LOSE: 16 lbs    8 Year Risk of Diabetes:  </= 3%       Visit Type: Initial Assessment    Bean Huynh referred by Dr. Andrews for MNT related to potential kidney donor evaluation    Patient accompanied by self    Nutrition Assessment:  Anthropometrics  Height:   70  in   BMI:    32.17    Weight Status:Obesity Grade I BMI 30-34.9   Weight:  224 lbs            IBW (lbs): 166  % IBW: 147    Wt Hx: Pt has gradually gained wt over the past 10 years. He reports losing 10-15 lbs when its summer time and regaining over winter time. Pt is confident he will be able to lose wt for donation.     Adj/dosing BW: 181 lbs/82 kg    Goal wt prior to donation: 208 lbs  (BMI <30 or per MD)          Recent Labs   Lab Test  04/27/17   0845   CHOL  203*   HDL  41   LDL  120*   TRIG  211*       BG = 88  A1C = 4.8      Prediction of Incident Diabetes Mellitus in Middle-aged Adults: The [place] Offspring Study  Jerry Tavera MD; James B. Meigs, MD, MPH; Elysia Ga, PhD; Melissa Solano MD, MPH; Marco Antonio Hernandez MD; Justo Nunes Sr,   PhD  Pt's estimated risk for T2DM (per Table 6 above)  Pt received points for the following criteria: BMI>25, BMI>30, TG>150  Total points: 10  8-Year risk of T2DM: </= 3%    Nutrition History  Pt follows a regular diet at home.  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    Nutrition Intervention:  Nutrition education provided:  Discussed strategies for wt loss (portion sizes, increased activity, monitoring caloric beverage intake).  Reviewed overall healthy diet guidelines for pre and post kidney donation. Discussed maintenance of a healthy weight, adequate intake of fiber, vitamin D, variety of fruits vegetables, whole grains and lean proteins.   Discussed Na+ intake <3000 mg/day and avoidance of herbal supplements post donation d/t unknown effects on the kidney.     Patient Understanding: Pt verbalized understanding of education provided.  Expected Compliance: Good  Follow-Up Plans: PRN     Nutrition Goals:  1. Wt loss to BMI<30 or <208 lbs  2. Na+ <3000 mg/day    Provided pt with contact info.    Time spent with patient: 15 minutes.  Lelia Miranda, RD, LD

## 2017-04-27 NOTE — MR AVS SNAPSHOT
After Visit Summary   4/27/2017    Bean Huynh    MRN: [id number]           Patient Information     Date Of Birth          1985        Visit Information        Provider Department      4/27/2017 1:30 PM Lelia Miranda RD [facility] Solid Organ Transplant        Today's Diagnoses     Transplant donor evaluation    -  1       Follow-ups after your visit        Your next 10 appointments already scheduled     Apr 27, 2017  1:00 PM CDT   (Arrive by 12:45 PM)   Kidney Donor Evaluation with Rasta Andrews MD   [facility] Solid Organ Transplant ([facility])    [address] [place] [id number]   [phone]            Apr 27, 2017  1:30 PM CDT   NUTRITION VISIT with Lelia Miranda RD   [facility] Solid Organ Transplant ([facility])    [address] [place] [phone]   [phone]            Apr 27, 2017  2:00 PM CDT   (Arrive by 1:30 PM)   Kidney Donor Evaluation with Jose Perez MD   [facility] Nephrology ([facility])    [address] [place] [phone]   [phone]            Apr 27, 2017  3:00 PM CDT   (Arrive by 2:45 PM)   CT RENAL ANGIO with UCCT1   [facility] Imaging [place] CT ([facility])    [address] [place] [id number] [phone]           Please bring any scans or X-rays taken at other hospitals, if similar tests were done. Also bring a list of your medicines, including vitamins, minerals and over-the-counter drugs. It is safest to leave personal items at home.  Be sure to tell your doctor:   If you have any allergies.   If there s any chance you are pregnant.   If you are breastfeeding.   If you have any special needs.  You will have contrast for this exam. To prepare:   Do not eat or drink for 2 hours before your exam. If you need to take medicine, you may take it  with small sips of water. (We may ask you to take liquid medicine as well.)   The day before your exam, drink extra fluids at least six 8-ounce glasses (unless your doctor tells you to restrict your fluids).  Patients over 70 or patients with diabetes or kidney problems:   If you haven t had a blood test (creatinine test) within the last 30 days, go to your clinic or Diagnostic Imaging Department for this test.  If you have diabetes:   If your kidney function is normal, continue taking your metformin (Avandamet, Glucophage, Glucovance, Metaglip) on the day of your exam.   If your kidney function is abnormal, wait 48 hours before restarting this medicine.  Please wear loose clothing, such as a sweat suit or jogging clothes. Avoid snaps, zippers and other metal. We may ask you to undress and put on a hospital gown.  If you have any questions, please call the Imaging Department where you will have your exam.            Apr 27, 2017  3:45 PM CDT   (Arrive by 3:30 PM)   XR CHEST 2 VIEWS with UCXR1   [facility] Imaging Center Xray ([facility] Clinics and Surgery Center)    [address] [address] [phone]           Please bring a list of your current medicines to your exam. (Include vitamins, minerals and over-thecounter medicines.) Leave your valuables at home.  Tell your doctor if there is a chance you may be pregnant.  You do not need to do anything special for this exam.              Who to contact     If you have questions or need follow up information about today's clinic visit or your schedule please contact [facility] SOLID ORGAN TRANSPLANT directly at [phone].  Normal or non-critical lab and imaging results will be communicated to you by MyChart, letter or phone within 4 business days after the clinic has received the results. If you do not hear from us within 7 days, please contact the clinic through MyChart or phone. If you have a critical or abnormal lab result, we will  "notify you by phone as soon as possible.  Submit refill requests through Linqia or call your pharmacy and they will forward the refill request to us. Please allow 3 business days for your refill to be completed.          Additional Information About Your Visit        Gaia HerbsharDecoholic Information     Linqia lets you send messages to your doctor, view your test results, renew your prescriptions, schedule appointments and more. To sign up, go to [email or web address] . Click on \"Log in\" on the left side of the screen, which will take you to the Welcome page. Then click on \"Sign up Now\" on the right side of the page.     You will be asked to enter the access code listed below, as well as some personal information. Please follow the directions to create your username and password.     Your access code is: [email or web address]  Expires: 2017 12:32 PM     Your access code will  in 90 days. If you need help or a new code, please call your [place] clinic or [phone].        Care EveryWhere ID     This is your Care EveryWhere ID. This could be used by other organizations to access your [place] medical records  [id number]         Blood Pressure from Last 3 Encounters:   17 116/74   17 128/80   16 124/82    Weight from Last 3 Encounters:   17 101.7 kg (224 lb 3.2 oz)   17 101.3 kg (223 lb 4.8 oz)   16 98.9 kg (218 lb)              Today, you had the following     No orders found for display         Today's Medication Changes          These changes are accurate as of: 17 11:55 AM.  If you have any questions, ask your nurse or doctor.               Stop taking these medicines if you haven't already. Please contact your care team if you have questions.     doxycycline 100 MG tablet   Commonly known as:  VIBRA-TABS           guaiFENesin-codeine 100-10 MG/5ML Soln solution   Commonly known as:  ROBITUSSIN AC           nystatin 905050 UNIT/GM Powd   Commonly known as:  MYCOSTATIN        "             Primary Care Provider Office Phone # Fax #    Piotr Zabala [phone]       [facility] [address]  [facility] [address]        Thank you!     Thank you for choosing [facility] SOLID ORGAN TRANSPLANT  for your care. Our goal is always to provide you with excellent care. Hearing back from our patients is one way we can continue to improve our services. Please take a few minutes to complete the written survey that you may receive in the mail after your visit with us. Thank you!             Your Updated Medication List - Protect others around you: Learn how to safely use, store and throw away your medicines at www.disposemymeds.org.      Notice  As of 4/27/2017 11:55 AM    You have not been prescribed any medications.

## 2017-04-27 NOTE — MR AVS SNAPSHOT
After Visit Summary   4/27/2017    Bean Huynh    MRN: [id number]           Patient Information     Date Of Birth          1985        Visit Information        Provider Department      4/27/2017 1:00 PM Rasta Andrews MD [facility] Solid Organ Transplant        Today's Diagnoses     Transplant donor evaluation           Follow-ups after your visit        Your next 10 appointments already scheduled     Apr 27, 2017  3:00 PM CDT   (Arrive by 2:45 PM)   CT RENAL ANGIO with UCCT1   [facility] Imaging [place] CT ([facility] Clinics [facility] Surgery Center)    [address] [address] [phone]           Please bring any scans or X-rays taken at other hospitals, if similar tests were done. Also bring a list of your medicines, including vitamins, minerals and over-the-counter drugs. It is safest to leave personal items at home.  Be sure to tell your doctor:   If you have any allergies.   If there s any chance you are pregnant.   If you are breastfeeding.   If you have any special needs.  You will have contrast for this exam. To prepare:   Do not eat or drink for 2 hours before your exam. If you need to take medicine, you may take it with small sips of water. (We may ask you to take liquid medicine as well.)   The day before your exam, drink extra fluids at least six 8-ounce glasses (unless your doctor tells you to restrict your fluids).  Patients over 70 or patients with diabetes or kidney problems:   If you haven t had a blood test (creatinine test) within the last 30 days, go to your clinic or Diagnostic Imaging Department for this test.  If you have diabetes:   If your kidney function is normal, continue taking your metformin (Avandamet, Glucophage, Glucovance, Metaglip) on the day of your exam.   If your kidney function is abnormal, wait 48 hours before restarting this medicine.  Please wear loose clothing, such as a sweat suit or jogging clothes. Avoid snaps, zippers  "and other metal. We may ask you to undress and put on a hospital gown.  If you have any questions, please call the Imaging Department where you will have your exam.            Apr 27, 2017  3:45 PM CDT   (Arrive by 3:30 PM)   XR CHEST 2 VIEWS with UCXR1   [facility] Imaging Center Xray ([facility]ics and Surgery Center)    [address] [address] [phone]           Please bring a list of your current medicines to your exam. (Include vitamins, minerals and over-thecounter medicines.) Leave your valuables at home.  Tell your doctor if there is a chance you may be pregnant.  You do not need to do anything special for this exam.              Who to contact     If you have questions or need follow up information about today's clinic visit or your schedule please contact [facility] SOLID ORGAN TRANSPLANT directly at [phone].  Normal or non-critical lab and imaging results will be communicated to you by Fulcrum Bioenergyhart, letter or phone within 4 business days after the clinic has received the results. If you do not hear from us within 7 days, please contact the clinic through [employer] Bioenergyhart or phone. If you have a critical or abnormal lab result, we will notify you by phone as soon as possible.  Submit refill requests through Bizzabo or call your pharmacy and they will forward the refill request to us. Please allow 3 business days for your refill to be completed.          Additional Information About Your Visit        Bizzabo Information     Bizzabo lets you send messages to your doctor, view your test results, renew your prescriptions, schedule appointments and more. To sign up, go to [email or web address] . Click on \"Log in\" on the left side of the screen, which will take you to the Welcome page. Then click on \"Sign up Now\" on the right side of the page.     You will be asked to enter the access code listed below, as well as some personal information. Please follow the directions to create " your username and password.     Your access code is: [email or web address]  Expires: 2017 12:32 PM     Your access code will  in 90 days. If you need help or a new code, please call your [facility] or [phone].        Care EveryWhere ID     This is your Care EveryWhere ID. This could be used by other organizations to access your [place] medical records  [id number]         Blood Pressure from Last 3 Encounters:   17 130/80   17 128/78   17 116/74    Weight from Last 3 Encounters:   17 101.7 kg (224 lb 3.2 oz)   17 101.3 kg (223 lb 4.8 oz)   16 98.9 kg (218 lb)              We Performed the Following     GENERAL SURG ADULT REFERRAL          Today's Medication Changes          These changes are accurate as of: 17  2:58 PM.  If you have any questions, ask your nurse or doctor.               Stop taking these medicines if you haven't already. Please contact your care team if you have questions.     doxycycline 100 MG tablet   Commonly known as:  VIBRA-TABS           guaiFENesin-codeine 100-10 MG/5ML Soln solution   Commonly known as:  ROBITUSSIN AC           nystatin [id number] UNIT/GM Powd   Commonly known as:  MYCOSTATIN                    Primary Care Provider Office Phone # Fax #    Piotr Zabala [phone]       [facility] [address] [address] [facility] [address]        Thank you!     Thank you for choosing [facility] SOLID ORGAN TRANSPLANT  for your care. Our goal is always to provide you with excellent care. Hearing back from our patients is one way we can continue to improve our services. Please take a few minutes to complete the written survey that you may receive in the mail after your visit with us. Thank you!             Your Updated Medication List - Protect others around you: Learn how to safely use, store and throw away your medicines at www.disposemymeds.org.      Notice  As of 2017  2:58 PM    You have not been  prescribed any medications.

## 2017-04-27 NOTE — PROGRESS NOTES
RE: Bean Huynh  [facility] #[id number]             I saw your patient, Bean Huynh, in consultation in our pretransplant clinic.  He was here at clinic for evaluation as a possible kidney donor to his girlfriend.  He had not yet seen our donor video.  Our donor coordinator shared our center-specific results with him.  We discussed:    (1) The risks of donation--including mortality (0.03% risk) and morbidity (approximately 1% risk of major morbidity).  We discussed the major reported causes of death after kidney donation (bleeding, infection, pulmonary embolism).  We discussed potential complications:  bleeding requiring reoperation, infection requiring reoperation, pneumonia, bowel obstruction, infection at the site of the incision, hernia at the site of the incision, deep vein thrombosis, deep vein thrombosis with associated pulmonary embolism, and urinary tract infection.  I told her I could not possibly name all of the risks, but that she was at risk for any complications that could occur in any operation.     We also discussed the long-term risks of living with one kidney.  We discussed the rare diseases that might affect only one kidney.  We talked about the new publications suggesting slightly increased long-term risk of ESRD after donor nephrectomy.  For people his age, there is really no data to show what the outcome will be at 50-60 years.  This is an important consideration.  We discussed the rare diseases that might affect having only one kidney.    (2) The hospital stay and the fact that if all goes well, discharge would occur within two to three days after donor nephrectomy.  We also discussed the fact that there would be no diet or fluid restrictions (again if all went well), and that the only new medication that he would be on would be pain medication.  We discussed the fact that most patients are on Tylenol or something similar within five to six days of surgery.      (3) The recovery time after  "surgery and the restrictions that are necessary: a) no driving for a couple of weeks (or until he felt that his/her reaction would be adequate if he had to slam on the brakes; and b) no lifting over 10 pounds or any exercise that would stretch his abdominal muscles for six weeks (to allow the muscles to heal so that he doesn't develop a hernia).  We also discussed return to work, which could occur in approximately three to four weeks if he had a desk job or would require not returning to work for six weeks if the job required any heavy lifting or exercise.  We discussed the potential for not getting his pep and energy back for anywhere from two to six weeks after surgery and how there was a tremendous individual variation in return of full energy level.    (4) The concern about long distance travel for the first couple of weeks post-donation.  We discussed the risks of deep vein thrombosis associated with plane or car travel.  I recommended that, if flying, he should get up and walk around every 30-40 minutes; if driving he should ask the  to stop the car every 30-45 minutes so Mr. Huynh could take a short walk.    (5) The fact that the recipient could be on a waiting for  donor transplant and would ultimately receive a kidney if Mr. Huynh did not donate.      (6) The difference between nondirected and directed donors.  I explained our policy of allocation of nondirected kidneys to the number one person on the list.  I also explained that by being a nondirected donor, they would not have the opportunity to meet the recipient (at least for the first six months) and, therefore, would not have the opportunity to see the potential \"benefit\" of the donation.  I also mentioned that our recipients often have not sent \"thank-you\" letters to their nondirected donors.  This would be an important thing to recognize as he went forward in thinking about nondirected donation.  Finally, we discussed the option " of entering the paired exchange pool--its advantage, increasing the number of transplants from the donation; the disadvantage, harder to schedule and more logistics.    Finally, he has a window in which he wants to donate. However, the transplant will be a paired exchange. I spent some time talking to him about the logistics may preclude him using the specific window he was interested in.    I attempted to answer any remaining questions.  I also told him that should he have any questions, he should feel free to contact us.  We would be glad to answer any questions either over the phone or at another clinic visit.  His transplant coordinator is Mercy Ventura and may be reached at [phone].  Thank you for the opportunity to see him.    I spent 30 minutes with this patient.  Over 95% of that time was spent in counseling and coordination of care.             Yours truly,               Rasta Andrews MD         Professor of Surgery         ([phone])      MARSHA/

## 2017-04-27 NOTE — PROGRESS NOTES
Donor Iohexol test    Bean Huynh presents today to [facility] for a Donor Iohexol test.      Progress note:  ID verified by name and .     The following information was verified with the patient:  Female Patients is there any possibility of being pregnant No  Is there a history of allergy (skin rash, swelling, ect) to:   A.  Iodine (except skin reactions to betadine): No   B. Intravenous radio-contrast agents: No   C. Seafood No    R.N. provided patient with educational handout regarding timed test. Yes    20 gauge PIV placed in Left AC.  Iohexol administered.  Following iohexol administration extension set replaced.  PIV left in place for blood draws and CT this afternoon    Medication administered:  Iohexol (Omnipaque 300mg iodine/ml concentration) 5 mls.    Start time: 0915  Stop time: 917    Evaluation nurse in transplant to draw labs at 2 and 4 hours post iohexol administration.  Patient given a slip with the times to get labs drawn and verbalized understanding of the plan.    Patient tolerated the procedure:  Yes    After the infusion patient was discharged to the next appointment.    Ericka Severson, RN

## 2017-04-27 NOTE — LETTER
4/27/2017      RE: Bean Huynh  [address] [place] [address]   [address]  [facility] [address]       Assessment and Plan:  1. Prospective kidney transplant donor with one issue that needs to be addressed prior to donation. Patient's blood pressure is acceptable at this visit, kidney function appears to be acceptable with Iohexol pending, and urinalysis is bland.  2. Overweight - patient is above BMI goal of 30 or less.  Recommend goal weight loss of 15 lbs to decrease risk of surgical wound complications and for his overall health.    Discussed the risks of donating a kidney, including the surgical risk and the possible risks of living with one kidney.    Education about expected post-donation kidney function and how chronic kidney disease (CKD) and end stage kidney disease (ESKD) might potentially impact the donor in the future, include, but not limited to:       - On average, donors will have 25-35% permanent loss of kidney function at donation.       - Baseline risk of ESKD may slightly exceed that of members of the general population with the same demographic profile.       - Donor risks must be interpreted in light of known epidemiology of both CKD or ESKD, such as that CKD generally develops in midlife (40-50 years old) and ESKD generally develops after age 60.       - Medical evaluation of young potential donors cannot predict lifetime risk of CKD or ESKD.       - Donors may be at higher risk for CKD if they sustain damage to the remaining kidney.       - Development of CKD and progression of ESKD may be more rapid with only 1 kidney.       - Some type of kidney replacement therapy, either kidney transplant or dialysis, is required when reaching ESKD.    Potential medical or surgical risks include, but not limited to:       - Death.       - Scars, pain, fatigue, and other consequences typical of any surgical procedure.       - Decreased kidney function.       - Abdominal or bowel symptoms, such as bloating and  nausea, and developing bowel obstruction.       - Kidney failure (ESKD) and the need for a kidney transplant or dialysis for the donor.       - Impact of obesity, hypertension, or other donor-specific medical conditions on morbidity and mortality of the potential donor.    Patients overall evaluation will be discussed with the transplant team and a final recommendation on the patients' suitability to be a kidney transplant donor will be made at that time.    Consult:  Bean Huynh was seen in consultation at the request of Dr. Linus Mckeon for evaluation as a potential kidney transplant donor.    Reason for Visit:  Bean Huynh is a 31 year old male who presents for a kidney donor evaluation.  Patient would like to donate to Janell Torres, his girlfriend.    HPI:    Patient reports feeling good overall with no significant medical complaints.  His energy level is good and pretty much normal.  He is active and does get some exercise.  Denies any chest pain or shortness of breath with exertion.  Appetite is good with no nausea, vomiting or diarrhea.  No fever, sweats or chills.  No leg swelling.         Kidney Disease Hx:       h/o Kidney Problems: No  Family h/o Genetic Kidney Disease: No       h/o HTN: No    Usual BP: Not checked       h/o Protein in Urine: No  h/o Blood in Urine: No       h/o Kidney Stones: No  h/o Kidney Injury: No       h/o Recurrent UTI: No  h/o Genitourinary Problems: No       h/o Chronic NSAID Use: No         Other Medical Hx:       h/o DM: No   h/o Gestational DM: Not applicable       h/o Preeclampsia: Not applicable          h/o Gastrointestinal, Pancreas or Liver Problems: No       h/o Lung or Heart Problems: No       h/o Hematologic Problems: No  h/o Bleeding or Clotting Problems: No       h/o Cancer: No       h/o Infection Problems: No         Skin Cancer Risk:       h/o more than 50 moles: No       h/o extensive sun exposure: No       h/o melanoma: No       Family h/o melanoma: No          Mental Health Assessment:       h/o Depression: No       h/o Psychiatric Illness: No       h/o Suicidal Attempt(s): No    ROS:   A comprehensive review of systems was obtained and negative, except as noted in the HPI or PMH.    PMH:   History was taken from the patient as noted below.  Past Medical History:   Diagnosis Date     NO ACTIVE PROBLEMS        PSH:   Past Surgical History:   Procedure Laterality Date     s/p wisdom teeth extraction       Personal or family history of anesthesia problems: No    Family Hx:   Family History   Problem Relation Age of Onset     Hypertension Father      HEART DISEASE Maternal Grandmother      DIABETES Paternal Grandfather      DIABETES Paternal Aunt      Colon Cancer Maternal Uncle           Specific Family Hx:       FH of DM: No       FH of CAD: No  FH of HTN: Yes        FH of Cancer: No  FH of Kidney Cancer: No    Personal Hx:   Social History     Social History     Marital status: Single     Spouse name: N/A     Number of children: 0     Years of education: N/A     Occupational History     Not on file.     Social History Main Topics     Smoking status: Never Smoker     Smokeless tobacco: Never Used     Alcohol use 3.0 oz/week     5 Standard drinks or equivalent per week      Comment: social     Drug use: No     Sexual activity: Yes     Partners: Female     Birth control/ protection: Condom     Other Topics Concern     Parent/Sibling W/ Cabg, Mi Or Angioplasty Before 65f 55m? No     Social History Narrative          Specific Social Hx:       Health Insurance Status: Yes       Employment Status: Full time  Occupation: Senior engineering tech                       Living Arrangements: Lives with significant other       Social Support: Yes       Presence of increased risk for disease transmission behaviors as defined by PHS guidelines: No        Allergies:  No Known Allergies    Medications:  Prior to Admission medications    Not on File       Vitals:  Vital Signs 4/27/2017  "4/27/2017 4/27/2017   Systolic 116 130 128   Diastolic 74 80 78   Pulse 85 - -   Temperature 98.3 - -   Respirations 16 - -   Weight (LB) 224 lb 3.2 oz - -   Height 5' 10\" - -   BMI (Calculated) 32.24 - -   Pain - - -   O2 97 - -       Exam:   GENERAL APPEARANCE: alert and no distress  HENT: mouth without ulcers or lesions  LYMPHATICS: no cervical or supraclavicular nodes  RESP: lungs clear to auscultation - no rales, rhonchi or wheezes  CV: regular rhythm, normal rate, no rub, no murmur  EDEMA: no LE edema bilaterally  ABDOMEN: soft, nondistended, nontender, bowel sounds normal  MS: extremities normal - no gross deformities noted, no evidence of inflammation in joints, no muscle tenderness  SKIN: no rash    Results:   Labs and imaging were ordered for this visit and reviewed by me.  Recent Results (from the past 336 hour(s))   EKG 12-lead complete w/read - Clinics    Collection Time: 04/27/17  8:41 AM   Result Value Ref Range    Interpretation ECG Click View Image link to view waveform and result    ABO/Rh type and screen    Collection Time: 04/27/17  8:45 AM   Result Value Ref Range    ABO A     RH(D)  Neg     Antibody Screen Neg     Test Valid Only At       [facility]    Specimen Expires 04/30/2017    Hepatitis B core antibody    Collection Time: 04/27/17  8:45 AM   Result Value Ref Range    Hepatitis B Core Fabby Nonreactive NR   Hepatitis B Surface Antibody    Collection Time: 04/27/17  8:45 AM   Result Value Ref Range    Hepatitis B Surface Antibody 54.58 (H) <8.00 m[IU]/mL   Hepatitis B surface antigen    Collection Time: 04/27/17  8:45 AM   Result Value Ref Range    Hep B Surface Agn Nonreactive NR   Hepatitis C antibody    Collection Time: 04/27/17  8:45 AM   Result Value Ref Range    Hepatitis C Antibody  NR     Nonreactive   Assay performance characteristics have not been established for newborns,   infants, and children     HIV Antigen Antibody Combo " Pretransplant    Collection Time: 04/27/17  8:45 AM   Result Value Ref Range    HIV Antigen Antibody Combo Pretransplant  NR     Nonreactive   HIV-1 p24 Ag & HIV-1/HIV-2 Ab Not Detected     Anti Treponema    Collection Time: 04/27/17  8:45 AM   Result Value Ref Range    Treponema pallidum Antibody Negative NEG   ABO Subtyping [[id number]]    Collection Time: 04/27/17  8:45 AM   Result Value Ref Range    Antigen Type A1 Negative    Lipid Profile    Collection Time: 04/27/17  8:45 AM   Result Value Ref Range    Cholesterol 203 (H) <200 mg/dL    Triglycerides 211 (H) <150 mg/dL    HDL Cholesterol 41 >39 mg/dL    LDL Cholesterol Calculated 120 (H) <100 mg/dL    Non HDL Cholesterol 162 (H) <130 mg/dL   Comprehensive metabolic panel    Collection Time: 04/27/17  8:45 AM   Result Value Ref Range    Sodium 136 133 - 144 mmol/L    Potassium 4.1 3.4 - 5.3 mmol/L    Chloride 104 94 - 109 mmol/L    Carbon Dioxide 26 20 - 32 mmol/L    Anion Gap 7 3 - 14 mmol/L    Glucose 88 70 - 99 mg/dL    Urea Nitrogen 15 7 - 30 mg/dL    Creatinine 0.88 0.66 - 1.25 mg/dL    GFR Estimate >90  Non  GFR Calc   >60 mL/min/1.7m2    GFR Estimate If Black >90   GFR Calc   >60 mL/min/1.7m2    Calcium 8.6 8.5 - 10.1 mg/dL    Bilirubin Total 1.2 0.2 - 1.3 mg/dL    Albumin 4.0 3.4 - 5.0 g/dL    Protein Total 7.7 6.8 - 8.8 g/dL    Alkaline Phosphatase 70 40 - 150 U/L    ALT 50 0 - 70 U/L    AST 27 0 - 45 U/L   Phosphorus    Collection Time: 04/27/17  8:45 AM   Result Value Ref Range    Phosphorus 2.9 2.5 - 4.5 mg/dL   Hemoglobin A1c    Collection Time: 04/27/17  8:45 AM   Result Value Ref Range    Hemoglobin A1C 4.8 4.3 - 6.0 %   INR    Collection Time: 04/27/17  8:45 AM   Result Value Ref Range    INR 0.96 0.86 - 1.14   Partial thromboplastin time    Collection Time: 04/27/17  8:45 AM   Result Value Ref Range    PTT 31 22 - 37 sec   CBC with platelets    Collection Time: 04/27/17  8:45 AM   Result Value Ref Range    WBC 6.6  4.0 - 11.0 10e9/L    RBC Count 5.08 4.4 - 5.9 10e12/L    Hemoglobin 16.2 13.3 - 17.7 g/dL    Hematocrit 46.6 40.0 - 53.0 %    MCV 92 78 - 100 fl    MCH 31.9 26.5 - 33.0 pg    MCHC 34.8 31.5 - 36.5 g/dL    RDW 11.9 10.0 - 15.0 %    Platelet Count 201 150 - 450 10e9/L   CMV Antibody IgG    Collection Time: 04/27/17  8:45 AM   Result Value Ref Range    CMV Antibody IgG 5.7 (H) 0.0 - 0.8 AI   EBV Capsid Antibody IgG    Collection Time: 04/27/17  8:45 AM   Result Value Ref Range    EBV Capsid Antibody IgG 2.8 (H) 0.0 - 0.8 AI   EBV Capsid Antibody IgM    Collection Time: 04/27/17  8:45 AM   Result Value Ref Range    EBV Capsid Antibody IgM 0.2 0.0 - 0.8 AI   M Tuberculosis by Quantiferon    Collection Time: 04/27/17  8:45 AM   Result Value Ref Range    M Tuberculosis Result Negative NEG    M Tuberculosis Antigen Value 0.02 IU/mL   Uric acid    Collection Time: 04/27/17  8:45 AM   Result Value Ref Range    Uric Acid 6.6 3.5 - 7.2 mg/dL   Blood Group A Subtype    Collection Time: 04/27/17  8:45 AM   Result Value Ref Range    Antigen Type Canceled, Test credited     Blood Bank Comment Duplicate request  4/27/17 1002 IF      Routine UA with microscopic    Collection Time: 04/27/17  8:48 AM   Result Value Ref Range    Color Urine Yellow     Appearance Urine Clear     Glucose Urine Negative NEG mg/dL    Bilirubin Urine Negative NEG    Ketones Urine Negative NEG mg/dL    Specific Gravity Urine 1.025 1.003 - 1.035    Blood Urine Negative NEG    pH Urine 6.0 5.0 - 7.0 pH    Protein Albumin Urine Negative NEG mg/dL    Urobilinogen mg/dL 0.0 0.0 - 2.0 mg/dL    Nitrite Urine Negative NEG    Leukocyte Esterase Urine Negative NEG    Source Midstream Urine     WBC Urine 1 0 - 2 /HPF    RBC Urine 1 0 - 2 /HPF    Mucous Urine Present (A) NEG /LPF   Microalbumin quantitative random urine    Collection Time: 04/27/17  8:48 AM   Result Value Ref Range    Creatinine Urine 295 mg/dL    Albumin Urine mg/L 17 mg/L    Albumin Urine mg/g Cr 5.80  0 - 17 mg/g Cr   Protein  random urine    Collection Time: 04/27/17  8:48 AM   Result Value Ref Range    Protein Random Urine 0.22 g/L    Protein Total Urine g/gr Creatinine 0.08 0 - 0.2 g/g Cr   CT Renal angio    Collection Time: 04/27/17  3:37 PM   Result Value Ref Range    Radiologist flags Lesion in the left iliac body/wing, recommend MRI              Jose Perez MD

## 2017-04-27 NOTE — MR AVS SNAPSHOT
After Visit Summary   4/27/2017    Bean Huynh    MRN: [id number]           Patient Information     Date Of Birth          1985        Visit Information        Provider Department      4/27/2017 9:00 AM UC 48 ATC; UC SPEC INFUSION [facility] [place]        Today's Diagnoses     Transplant donor evaluation    -  1       Follow-ups after your visit        Your next 10 appointments already scheduled     Apr 27, 2017  2:00 PM CDT   (Arrive by 1:30 PM)   Kidney Donor Evaluation with Jose Perez MD   [facility] ([facility])    [address]  3rd Floor  [facility] [id number] [phone]            Apr 27, 2017  3:00 PM CDT   (Arrive by 2:45 PM)   CT RENAL ANGIO with UCCT1   [facility] CT ([facility])    [address]  [address] [facility] [id number] [phone]           Please bring any scans or X-rays taken at other hospitals, if similar tests were done. Also bring a list of your medicines, including vitamins, minerals and over-the-counter drugs. It is safest to leave personal items at home.  Be sure to tell your doctor:   If you have any allergies.   If there s any chance you are pregnant.   If you are breastfeeding.   If you have any special needs.  You will have contrast for this exam. To prepare:   Do not eat or drink for 2 hours before your exam. If you need to take medicine, you may take it with small sips of water. (We may ask you to take liquid medicine as well.)   The day before your exam, drink extra fluids at least six 8-ounce glasses (unless your doctor tells you to restrict your fluids).  Patients over 70 or patients with diabetes or kidney problems:   If you haven t had a blood test (creatinine test) within the last 30 days, go to your clinic or Diagnostic Imaging Department for this test.  If you have diabetes:   If your kidney  function is normal, continue taking your metformin (Avandamet, Glucophage, Glucovance, Metaglip) on the day of your exam.   If your kidney function is abnormal, wait 48 hours before restarting this medicine.  Please wear loose clothing, such as a sweat suit or jogging clothes. Avoid snaps, zippers and other metal. We may ask you to undress and put on a hospital gown.  If you have any questions, please call the Imaging Department where you will have your exam.            Apr 27, 2017  3:45 PM CDT   (Arrive by 3:30 PM)   XR CHEST 2 VIEWS with UCXR1   [facility] ([facility])    [address]  1st Floor  [facility] [address] [phone]           Please bring a list of your current medicines to your exam. (Include vitamins, minerals and over-thecounter medicines.) Leave your valuables at home.  Tell your doctor if there is a chance you may be pregnant.  You do not need to do anything special for this exam.              Who to contact     If you have questions or need follow up information about today's clinic visit or your schedule please contact [facility] directly at [phone].  Normal or non-critical lab and imaging results will be communicated to you by Skip[employer]um[employer], letter or phone within 4 business days after the clinic has received the results. If you do not hear from us within 7 days, please contact the clinic through [employer] or phone. If you have a critical or abnormal lab result, we will notify you by phone as soon as possible.  Submit refill requests through [employer] or call your pharmacy and they will forward the refill request to us. Please allow 3 business days for your refill to be completed.          Additional Information About Your Visit        [employer] Information     [employer] lets you send messages to your doctor, view your test results, renew your prescriptions, schedule appointments and more. To  "sign up, go to [email or web address] . Click on \"Log in\" on the left side of the screen, which will take you to the Welcome page. Then click on \"Sign up Now\" on the right side of the page.     You will be asked to enter the access code listed below, as well as some personal information. Please follow the directions to create your username and password.     Your access code is: [email or web address]  Expires: 2017 12:32 PM     Your access code will  in 90 days. If you need help or a new code, please call your [facility] clinic or [phone].        Care EveryWhere ID     This is your Care EveryWhere ID. This could be used by other organizations to access your [facility] medical records  [id number]         Blood Pressure from Last 3 Encounters:   17 128/78   17 116/74   17 128/80    Weight from Last 3 Encounters:   17 101.7 kg (224 lb 3.2 oz)   17 101.3 kg (223 lb 4.8 oz)   16 98.9 kg (218 lb)              We Performed the Following     Iohexol     Treatment Conditions     Treatment Conditions     Treatment Conditions     Treatment Conditions     Treatment Conditions          Today's Medication Changes          These changes are accurate as of: 17  1:35 PM.  If you have any questions, ask your nurse or doctor.               Stop taking these medicines if you haven't already. Please contact your care team if you have questions.     doxycycline 100 MG tablet   Commonly known as:  VIBRA-TABS           guaiFENesin-codeine 100-10 MG/5ML Soln solution   Commonly known as:  ROBITUSSIN AC           nystatin 899407 UNIT/GM Powd   Commonly known as:  MYCOSTATIN                    Primary Care Provider Office Phone # Fax #    Piotr Zabala [phone]       [facility] [address]  [facility] [address]        Thank you!     Thank you for choosing [facility]  for your care. Our goal is always to " provide you with excellent care. Hearing back from our patients is one way we can continue to improve our services. Please take a few minutes to complete the written survey that you may receive in the mail after your visit with us. Thank you!             Your Updated Medication List - Protect others around you: Learn how to safely use, store and throw away your medicines at www.disposemymeds.org.      Notice  As of 4/27/2017  1:35 PM    You have not been prescribed any medications.

## 2017-04-27 NOTE — PROGRESS NOTES
Psychosocial Evaluation  Living Organ Donation per OPTN Policy 14.1.A  Organ Type: unrelated, kidney  Presenting Information:  Mr. Bean Huynh presents to the [facility] to complete a psychosocial evaluation since he is interested in becoming a kidney donor to his girlfriend, Ms. Janell Torres, age 30.  Mr. Huynh is a 31 year old, single, white, American, male.  He is alone in clinic today.    PERSONAL BACKGROUND:  Current Living Situation: Mr. Huynh and his girlfriend, Janell Torres, who is the potential recipient, live in an apartment in [facility], which is very near the [facility].    Education/Employment/Financial Situation: Mr. Huynh has a 2 year associate's degree in civil engineering.  He works full time as a .  He has been in this job for 5 years.  He has medical insurance, paid time off, and short term disability insurance through his employer.  He denies that he is concerned about financial burden due to living kidney donation.    Family History: Mr. Huynh was born and raised in [place], [place].  He has a younger brother and a younger sister.  His parents are living and well and still reside in [place], [place].    General Health: Mr. Huynh appears to be in good general health.  He denies any past surgical history.    Mental Health: Denies any past or present treatment for mental health issues.  Denies any need to see a counselor or therapist.  Denies any past suicidal ideation, plans, or past attempts.  Denies any use of psychotropic medications.  Denies any past history of hospitalization for psychiatric illness.    Alcohol and Drug Use/Abuse/Dependency: Denies any past history of abuse or dependency on alcohol or illicit drugs. Denies any current use of street drugs, including marijuana.  Denies any past history of negative consequences of use of alcohol or drugs such as a DUI.     Lino reports that he consumes 2 to 3 servings of alcohol per week.    Cigarette Use: Mr. Huynh quit smoking in .  Prior to that, he smoked 1/2 pack per day for 7 years.    Legal: Mr. Hunyh denies any legal concerns.    Coping Strategies/Support System: Mr. Huynh likes to spend time with his friends and play softball.  He likes to grill out and socialize in his free time.  Mr. Huynh reports that his parents are supportive of him being a kidney donor to his girlfriend, and have said that they will support him post surgery.  He will likely stay with his parents after surgery for his recovery.    DONOR SPECIFIC INFORMATION:  Relationship to Recipient: Mr. Huynh wants to donate a kidney to his girlfriend, Ms. Janlel Torres, age 30.  Mr. Huynh does not know the cause of Ms. Torres's kidney disease, but he states that this would be her second transplant.  Her first transplant failed in part due to non-compliance.  Her first transplant was from a  donor.  According to Mr. Huynh, Janell is Hmong and the cultural beliefs about organ donation in her family cause problems with her adherence to immunosuppression therapy.  Mr. Huynh reports that recently, Ms. Torres was called by the transplant team with a  donor offer, but she ultimately was not given that kidney.  He does not know the particular details, but apparently it was not a good offer for her.    Decision Process/Motivation to Donate: Mr. Huynh wants to help his girlfriend get off of dialysis.  He denies feeling any pressure, or coercion in any way.  He denies being offered any payment to be a donor.      PREPARATION FOR DONATION, RECOVERY, AND POTENTIAL SHORT-LONG-TERM OUTCOMES:  Understanding of the Living Donation Process:   We discussed the role of the donor  and Independent Donor Advocate.  Short and long term medical and psychosocial risks to both, donor and recipient were reviewed and he appears to  understand these risks.  High risk behaviors as defined by [place] Public Health Services (PHS) 2013 that have potential to increase risk of disease transmission were reviewed and he denies any high risk behaviors. Post surgical restrictions (2 weeks no driving, 6 weeks no lifting over 10 lbs) were reviewed and he appears capable of adhering to the post surgical requirements. The need for a caregiver was discussed and he reports that his parents will care for him post surgery .  The risk of poor psychosocial outcome including problems with body image, post-surgery depression or anxiety, or feelings of emotional distress or bereavement if recipient experiences any recurrent disease, poor outcome or death was reviewed.  Additionally, potential financial implications, including the risk of having difficulty obtaining health care insurance, life insurance, disability insurance, or long term care insurance were reviewed, as were available donor grants to assist with donor related expenses.      We also discussed some unique issues that arise with paired kidney donation, which include the uncertainty of the timing and the importance of having a employment situation and support system that is able to provide sustained support and flexibility.    Mr. Huynh appears capable of understanding this information and making an informed medical decision.    Impressions/Recommendations:   Mr. Bean Huynh  is highly motivated to donate kidney  to his girlfriend, Ms. Janell Torres, age 30.  His decision to donate is free of inducement, coercion, or other undue pressure.   His housing, finances and employment are stable.  No current/active mental health or chemical abuse issues were identified.  The need for a caregiver was reviewed and he is able to identify a plan to meet his post operative care needs.  Mr. Huynh appears capable of making an informed medical decision.  No psychosocial contraindications to living organ donation were  identified and  I support Mr. Huynh s desire to donate a kidney to his girlfriend, Ms. Janell Torres, age 30.       Contact Information:   TROY Dasilva, [facility]  Clinical  and Independent Donor Advocate  [facility]  Pager:  [phone]  Direct:  [phone]    Time Spent: 60 minutes      Living Kidney Donor Consent per OPTN Policy 14.3.A for Independent Living Donor Advocate (JOSE)    Written assurance has been obtained from the potential donor that he/she:   Is willing to donate  Is free from inducement and coercion  Has been informed that the he/she may decline to donate at any time  Has been informed that transplant centers must:   A) Offer donors an opportunity to discontinue the donor consent or evaluation process in a way that is protected and confidential  B) Provide an independent living donor advocate (JOSE) to assist the potential donor during this process    The following was presented to the potential donor in a language in which the potential donor is able to engage in meaningful dialogue:   Education and instruction about all phases of the living donation process including:   Consent  Medical and psychosocial evaluations  Pre and post operative care  Required post operative follow up  Disclosure that the recovery hospital will take all reasonable precautions to provide confidentiality for the donor/recipient  Disclosure that it is a federal crime for any person to knowingly acquire, obtain or otherwise transfer any human organ for valuable consideration  Disclosure that the [facility] hospital must provide an independent living donor advocate (JOSE)  Disclosure that health information obtained during the evaluation is subject to the same regulations as all records and could reveal conditions that must be reported to local, state, or federal public health authorities  Disclosure that the [facility] hospital is required to report living donor  follow up information at 6 months, 1 year, and 2 years, and that the potential donor must commit to post operative follow up testing coordinated by the [facility]    Disclosure has been provided that these risks may be transient or permanent & include but are not limited to:  Potential psychosocial risks:  Problems with body image  Post-surgery depression or anxiety  Feelings of emotional distress or bereavement if recipient experiences any recurrent disease or in the event of the recipient s death  Impact of donation on the donor s lifestyle    Potential financial impacts:  Personal expenses of travel, housing, , lost wages related to donation might not be reimbursed. However, resources may be available to defray some donation-related expenses   Need for life-long follow up at the donor s expense  Loss of employment or income  Negative impact on the ability to obtain future employment  Negative impact on the ability to obtain, maintain, or afford health, disability, and life insurance  Future health problems experienced by living donors following donation may not be covered by the recipient s insurance    Contact Information:  TROY Dasilva, [facility]  Clinical  and Independent Donor Advocate  [facility]  Pager:  [phone]  Direct:  [phone]      Time Spent: 60 minutes

## 2017-04-27 NOTE — LETTER
4/27/2017       RE: Bean Huynh  [address] [place] [address] [address]   [address]  [facility] [address]     Dear Colleague,    Thank you for referring your patient, Bean Huynh, to the [facility] at [facility]. Please see a copy of my visit note below.    Patient attended all appointments and completed all scheduled tests.  Patient to follow up with Transplant Coordinator.  Patient stated understanding and has contact numbers.      Again, thank you for allowing me to participate in the care of your patient.      Sincerely,    Transplant Evaluation Resource

## 2017-04-28 LAB
INTERPRETATION ECG - MUSE: NORMAL
M TB TUBERC IFN-G BLD QL: NEGATIVE
M TB TUBERC IFN-G/MITOGEN IGNF BLD: 0.02 IU/ML
RADIOLOGIST FLAGS: NORMAL

## 2017-04-29 NOTE — PROGRESS NOTES
Assessment and Plan:  1. Prospective kidney transplant donor with one issue that needs to be addressed prior to donation. Patient's blood pressure is acceptable at this visit, kidney function appears to be acceptable with Iohexol pending, and urinalysis is bland.  2. Overweight - patient is above BMI goal of 30 or less.  Recommend goal weight loss of 15 lbs to decrease risk of surgical wound complications and for his overall health.    Discussed the risks of donating a kidney, including the surgical risk and the possible risks of living with one kidney.    Education about expected post-donation kidney function and how chronic kidney disease (CKD) and end stage kidney disease (ESKD) might potentially impact the donor in the future, include, but not limited to:       - On average, donors will have 25-35% permanent loss of kidney function at donation.       - Baseline risk of ESKD may slightly exceed that of members of the general population with the same demographic profile.       - Donor risks must be interpreted in light of known epidemiology of both CKD or ESKD, such as that CKD generally develops in midlife (40-50 years old) and ESKD generally develops after age 60.       - Medical evaluation of young potential donors cannot predict lifetime risk of CKD or ESKD.       - Donors may be at higher risk for CKD if they sustain damage to the remaining kidney.       - Development of CKD and progression of ESKD may be more rapid with only 1 kidney.       - Some type of kidney replacement therapy, either kidney transplant or dialysis, is required when reaching ESKD.    Potential medical or surgical risks include, but not limited to:       - Death.       - Scars, pain, fatigue, and other consequences typical of any surgical procedure.       - Decreased kidney function.       - Abdominal or bowel symptoms, such as bloating and nausea, and developing bowel obstruction.       - Kidney failure (ESKD) and the need for a kidney  transplant or dialysis for the donor.       - Impact of obesity, hypertension, or other donor-specific medical conditions on morbidity and mortality of the potential donor.    Patients overall evaluation will be discussed with the transplant team and a final recommendation on the patients' suitability to be a kidney transplant donor will be made at that time.    Consult:  Bean Huynh was seen in consultation at the request of Dr. Linus Mckeon for evaluation as a potential kidney transplant donor.    Reason for Visit:  Bean Huynh is a 31 year old male who presents for a kidney donor evaluation.  Patient would like to donate to Janell Torres, his girlfriend.    HPI:    Patient reports feeling good overall with no significant medical complaints.  His energy level is good and pretty much normal.  He is active and does get some exercise.  Denies any chest pain or shortness of breath with exertion.  Appetite is good with no nausea, vomiting or diarrhea.  No fever, sweats or chills.  No leg swelling.         Kidney Disease Hx:       h/o Kidney Problems: No  Family h/o Genetic Kidney Disease: No       h/o HTN: No    Usual BP: Not checked       h/o Protein in Urine: No  h/o Blood in Urine: No       h/o Kidney Stones: No  h/o Kidney Injury: No       h/o Recurrent UTI: No  h/o Genitourinary Problems: No       h/o Chronic NSAID Use: No         Other Medical Hx:       h/o DM: No   h/o Gestational DM: Not applicable       h/o Preeclampsia: Not applicable          h/o Gastrointestinal, Pancreas or Liver Problems: No       h/o Lung or Heart Problems: No       h/o Hematologic Problems: No  h/o Bleeding or Clotting Problems: No       h/o Cancer: No       h/o Infection Problems: No         Skin Cancer Risk:       h/o more than 50 moles: No       h/o extensive sun exposure: No       h/o melanoma: No       Family h/o melanoma: No         Mental Health Assessment:       h/o Depression: No       h/o Psychiatric Illness: No       h/o  Suicidal Attempt(s): No    ROS:   A comprehensive review of systems was obtained and negative, except as noted in the HPI or PMH.    PMH:   History was taken from the patient as noted below.  Past Medical History:   Diagnosis Date     NO ACTIVE PROBLEMS        PSH:   Past Surgical History:   Procedure Laterality Date     s/p wisdom teeth extraction       Personal or family history of anesthesia problems: No    Family Hx:   Family History   Problem Relation Age of Onset     Hypertension Father      HEART DISEASE Maternal Grandmother      DIABETES Paternal Grandfather      DIABETES Paternal Aunt      Colon Cancer Maternal Uncle           Specific Family Hx:       FH of DM: No       FH of CAD: No  FH of HTN: Yes        FH of Cancer: No  FH of Kidney Cancer: No    Personal Hx:   Social History     Social History     Marital status: Single     Spouse name: N/A     Number of children: 0     Years of education: N/A     Occupational History     Not on file.     Social History Main Topics     Smoking status: Never Smoker     Smokeless tobacco: Never Used     Alcohol use 3.0 oz/week     5 Standard drinks or equivalent per week      Comment: social     Drug use: No     Sexual activity: Yes     Partners: Female     Birth control/ protection: Condom     Other Topics Concern     Parent/Sibling W/ Cabg, Mi Or Angioplasty Before 65f 55m? No     Social History Narrative          Specific Social Hx:       Health Insurance Status: Yes       Employment Status: Full time  Occupation: Senior engineering tech                       Living Arrangements: Lives with significant other       Social Support: Yes       Presence of increased risk for disease transmission behaviors as defined by PHS guidelines: No        Allergies:  No Known Allergies    Medications:  Prior to Admission medications    Not on File       Vitals:  Vital Signs 4/27/2017 4/27/2017 4/27/2017   Systolic 116 130 128   Diastolic 74 80 78   Pulse 85 - -   Temperature 98.3 -  "-   Respirations 16 - -   Weight (LB) 224 lb 3.2 oz - -   Height 5' 10\" - -   BMI (Calculated) 32.24 - -   Pain - - -   O2 97 - -       Exam:   GENERAL APPEARANCE: alert and no distress  HENT: mouth without ulcers or lesions  LYMPHATICS: no cervical or supraclavicular nodes  RESP: lungs clear to auscultation - no rales, rhonchi or wheezes  CV: regular rhythm, normal rate, no rub, no murmur  EDEMA: no LE edema bilaterally  ABDOMEN: soft, nondistended, nontender, bowel sounds normal  MS: extremities normal - no gross deformities noted, no evidence of inflammation in joints, no muscle tenderness  NEURO: non focal  SKIN: no rash    Results:   Labs and imaging were ordered for this visit and reviewed by me.  Recent Results (from the past 336 hour(s))   EKG 12-lead complete w/read - Clinics    Collection Time: 04/27/17  8:41 AM   Result Value Ref Range    Interpretation ECG Click View Image link to view waveform and result    ABO/Rh type and screen    Collection Time: 04/27/17  8:45 AM   Result Value Ref Range    ABO A     RH(D)  Neg     Antibody Screen Neg     Test Valid Only At       [facility]    Specimen Expires 04/30/2017    Hepatitis B core antibody    Collection Time: 04/27/17  8:45 AM   Result Value Ref Range    Hepatitis B Core Fabby Nonreactive NR   Hepatitis B Surface Antibody    Collection Time: 04/27/17  8:45 AM   Result Value Ref Range    Hepatitis B Surface Antibody 54.58 (H) <8.00 m[IU]/mL   Hepatitis B surface antigen    Collection Time: 04/27/17  8:45 AM   Result Value Ref Range    Hep B Surface Agn Nonreactive NR   Hepatitis C antibody    Collection Time: 04/27/17  8:45 AM   Result Value Ref Range    Hepatitis C Antibody  NR     Nonreactive   Assay performance characteristics have not been established for newborns,   infants, and children     HIV Antigen Antibody Combo Pretransplant    Collection Time: 04/27/17  8:45 AM   Result Value Ref Range    HIV Antigen " Antibody Combo Pretransplant  NR     Nonreactive   HIV-1 p24 Ag & HIV-1/HIV-2 Ab Not Detected     Anti Treponema    Collection Time: 04/27/17  8:45 AM   Result Value Ref Range    Treponema pallidum Antibody Negative NEG   ABO Subtyping [[id number]]    Collection Time: 04/27/17  8:45 AM   Result Value Ref Range    Antigen Type A1 Negative    Lipid Profile    Collection Time: 04/27/17  8:45 AM   Result Value Ref Range    Cholesterol 203 (H) <200 mg/dL    Triglycerides 211 (H) <150 mg/dL    HDL Cholesterol 41 >39 mg/dL    LDL Cholesterol Calculated 120 (H) <100 mg/dL    Non HDL Cholesterol 162 (H) <130 mg/dL   Comprehensive metabolic panel    Collection Time: 04/27/17  8:45 AM   Result Value Ref Range    Sodium 136 133 - 144 mmol/L    Potassium 4.1 3.4 - 5.3 mmol/L    Chloride 104 94 - 109 mmol/L    Carbon Dioxide 26 20 - 32 mmol/L    Anion Gap 7 3 - 14 mmol/L    Glucose 88 70 - 99 mg/dL    Urea Nitrogen 15 7 - 30 mg/dL    Creatinine 0.88 0.66 - 1.25 mg/dL    GFR Estimate >90  Non  GFR Calc   >60 mL/min/1.7m2    GFR Estimate If Black >90   GFR Calc   >60 mL/min/1.7m2    Calcium 8.6 8.5 - 10.1 mg/dL    Bilirubin Total 1.2 0.2 - 1.3 mg/dL    Albumin 4.0 3.4 - 5.0 g/dL    Protein Total 7.7 6.8 - 8.8 g/dL    Alkaline Phosphatase 70 40 - 150 U/L    ALT 50 0 - 70 U/L    AST 27 0 - 45 U/L   Phosphorus    Collection Time: 04/27/17  8:45 AM   Result Value Ref Range    Phosphorus 2.9 2.5 - 4.5 mg/dL   Hemoglobin A1c    Collection Time: 04/27/17  8:45 AM   Result Value Ref Range    Hemoglobin A1C 4.8 4.3 - 6.0 %   INR    Collection Time: 04/27/17  8:45 AM   Result Value Ref Range    INR 0.96 0.86 - 1.14   Partial thromboplastin time    Collection Time: 04/27/17  8:45 AM   Result Value Ref Range    PTT 31 22 - 37 sec   CBC with platelets    Collection Time: 04/27/17  8:45 AM   Result Value Ref Range    WBC 6.6 4.0 - 11.0 10e9/L    RBC Count 5.08 4.4 - 5.9 10e12/L    Hemoglobin 16.2 13.3 - 17.7 g/dL     Hematocrit 46.6 40.0 - 53.0 %    MCV 92 78 - 100 fl    MCH 31.9 26.5 - 33.0 pg    MCHC 34.8 31.5 - 36.5 g/dL    RDW 11.9 10.0 - 15.0 %    Platelet Count 201 150 - 450 10e9/L   CMV Antibody IgG    Collection Time: 04/27/17  8:45 AM   Result Value Ref Range    CMV Antibody IgG 5.7 (H) 0.0 - 0.8 AI   EBV Capsid Antibody IgG    Collection Time: 04/27/17  8:45 AM   Result Value Ref Range    EBV Capsid Antibody IgG 2.8 (H) 0.0 - 0.8 AI   EBV Capsid Antibody IgM    Collection Time: 04/27/17  8:45 AM   Result Value Ref Range    EBV Capsid Antibody IgM 0.2 0.0 - 0.8 AI   M Tuberculosis by Quantiferon    Collection Time: 04/27/17  8:45 AM   Result Value Ref Range    M Tuberculosis Result Negative NEG    M Tuberculosis Antigen Value 0.02 IU/mL   Uric acid    Collection Time: 04/27/17  8:45 AM   Result Value Ref Range    Uric Acid 6.6 3.5 - 7.2 mg/dL   Blood Group A Subtype    Collection Time: 04/27/17  8:45 AM   Result Value Ref Range    Antigen Type Canceled, Test credited     Blood Bank Comment Duplicate request  4/27/17 1002 IF      Routine UA with microscopic    Collection Time: 04/27/17  8:48 AM   Result Value Ref Range    Color Urine Yellow     Appearance Urine Clear     Glucose Urine Negative NEG mg/dL    Bilirubin Urine Negative NEG    Ketones Urine Negative NEG mg/dL    Specific Gravity Urine 1.025 1.003 - 1.035    Blood Urine Negative NEG    pH Urine 6.0 5.0 - 7.0 pH    Protein Albumin Urine Negative NEG mg/dL    Urobilinogen mg/dL 0.0 0.0 - 2.0 mg/dL    Nitrite Urine Negative NEG    Leukocyte Esterase Urine Negative NEG    Source Midstream Urine     WBC Urine 1 0 - 2 /HPF    RBC Urine 1 0 - 2 /HPF    Mucous Urine Present (A) NEG /LPF   Microalbumin quantitative random urine    Collection Time: 04/27/17  8:48 AM   Result Value Ref Range    Creatinine Urine 295 mg/dL    Albumin Urine mg/L 17 mg/L    Albumin Urine mg/g Cr 5.80 0 - 17 mg/g Cr   Protein  random urine    Collection Time: 04/27/17  8:48 AM   Result Value  Ref Range    Protein Random Urine 0.22 g/L    Protein Total Urine g/gr Creatinine 0.08 0 - 0.2 g/g Cr   CT Renal angio    Collection Time: 04/27/17  3:37 PM   Result Value Ref Range    Radiologist flags Lesion in the left iliac body/wing, recommend MRI

## 2017-05-01 LAB
BSA: 2.27 M2
IOHEXOL CL UR+SERPL-VRATE: 1.97 MG/DL
IOHEXOL CL UR+SERPL-VRATE: 107 /1.73 M2
IOHEXOL CL UR+SERPL-VRATE: 140 ML/MIN
IOHEXOL CL UR+SERPL-VRATE: 5.47 MG/DL

## 2017-05-10 ENCOUNTER — COMMITTEE REVIEW (OUTPATIENT)
Dept: TRANSPLANT | Facility: CLINIC | Age: 32
End: 2017-05-10

## 2017-05-10 ENCOUNTER — TELEPHONE (OUTPATIENT)
Dept: TRANSPLANT | Facility: CLINIC | Age: 32
End: 2017-05-10

## 2017-05-10 NOTE — TELEPHONE ENCOUNTER
Talked with Bean to review his evaluation results. I discussed his normal results. I reviewed his weight and BMI. We discussed his need to lose 15-16 pounds to bring his weight to 208, per Dietitian. I discussed his CT results that revealed a probable benign bony lesion on the left iliac crest/wing. He will need an MRI prior to donation to diagnose. He is choosing to loose the weight first, then get the MRI. He states he drives long distances, and had hip and low back issues. He sees a chiropractor, and believes this finding is consistent with his care. I suggested he think about this more and get back to me should he decide to have the MRI sooner rather than later. He agreed and asked me to send him the CT report. I will send today.

## 2017-05-10 NOTE — COMMITTEE REVIEW
Abdominal Committee Review Note     Evaluation Date: 04/27/2017  Committee Review Date: 5/10/2017    Organ being evaluated for: Kidney    Transplant Phase:   Transplant Status:     Transplant Coordinator: No matching coordinators  Transplant Surgeon:       Referring Physician: Referred Self    Primary Diagnosis:   Secondary Diagnosis:     Committee Review Members:  Nephrology Martin Garcia MD   Nutrition Lelia Miranda,    Pharmacy Atif Green, [facility]    - Clinical Edith Escamilla, [facility], Ashley Daugherty, [facility]   Transplant Le Don, Rasta Andrews MD, Parvin Ventura, FRANDY, Thu Wise, ALEJANDRA, Joann Cazares LPN, Julieth Calderón, FRANDY, Jose Perez MD       Transplant Eligibility:     Committee Review Decision: Needs Re-presentation    Relative Contraindications: None    Absolute Contraindications: None    Committee Chair Rasta Andrews MD verbally attested to the committee's decision.    Committee Discussion Details: Discussed his results including: cholesterol 203 with triglyceride's 211. BMI 32. Creatinine Clearance 140. CT reviewed and Left is the choice given the early bifurcation on the right. His CT revealed a lesion in the left iliac wing with peripheral sclerosis-MRI is needed for diagnosis. He needs to lose 15 pounds to reduce his BMI to 30.Will re present after weight loss and MRI results.

## 2017-07-07 ENCOUNTER — TELEPHONE (OUTPATIENT)
Dept: TRANSPLANT | Facility: CLINIC | Age: 32
End: 2017-07-07

## 2017-07-07 NOTE — TELEPHONE ENCOUNTER
Janell called stating that her potential living donor, Bean, is experiencing some low back pain. She said that during his evaluation here, we found a bony lesion and informed Bean of the need to obtain a MRI for further diagnosis. She asked today if we would pay for him to have a MRI due to his current pain. I stated that no, we would not cover that expense. The committee decision was for him to lose 15lbs and that should he want to be a donor, he would need to see his PCP for MRI to diagnose this lesion. Bean did not want to to the MRI at that time, wanted to lose weight. He did ask for me to send the CT report, which I did. Janell understood this, and would relate the information to Bean.

## 2017-07-13 ENCOUNTER — TRANSFERRED RECORDS (OUTPATIENT)
Dept: HEALTH INFORMATION MANAGEMENT | Facility: CLINIC | Age: 32
End: 2017-07-13

## 2017-07-18 ENCOUNTER — PRE VISIT (OUTPATIENT)
Dept: ORTHOPEDICS | Facility: CLINIC | Age: 32
End: 2017-07-18

## 2017-07-18 NOTE — TELEPHONE ENCOUNTER
1.  Date/reason for appt: 7/19/17 - Left Pelvis bone lesion lipoma    2.  Referring provider: Dr. Rhonda Singleton     3.  Call to patient (Yes / No - short description): No, transferred from . Spoke with patient. No previous surgery related. Stated all outside [facility] with [facility] only.     4.  Previous care at / records requested from:     1. [facility] - faxed cover sheet   2. [facility] - faxed [facility] sheet

## 2017-07-18 NOTE — TELEPHONE ENCOUNTER
[facility] emailed Didi blackmon.     MR SI Joint 7/13/17

## 2017-07-18 NOTE — TELEPHONE ENCOUNTER
Records received from [facility].   Included  Office notes: 7/5/17  Radiology reports: MR sacroiliac joints on 7/13/17

## 2017-07-19 ENCOUNTER — OFFICE VISIT (OUTPATIENT)
Dept: ORTHOPEDICS | Facility: CLINIC | Age: 32
End: 2017-07-19

## 2017-07-19 VITALS — BODY MASS INDEX: 31.89 KG/M2 | HEIGHT: 69 IN | WEIGHT: 215.3 LBS

## 2017-07-19 DIAGNOSIS — M89.9 BONE LESION: Primary | ICD-10-CM

## 2017-07-19 PROBLEM — E78.5 DYSLIPIDEMIA: Status: ACTIVE | Noted: 2017-07-05

## 2017-07-19 RX ORDER — IBUPROFEN 600 MG/1
600 TABLET, FILM COATED ORAL
COMMUNITY
Start: 2017-06-25 | End: 2018-06-19

## 2017-07-19 RX ORDER — HYDROCODONE BITARTRATE AND ACETAMINOPHEN 5; 325 MG/1; MG/1
1-2 TABLET ORAL
COMMUNITY
Start: 2017-06-25 | End: 2018-06-19

## 2017-07-19 NOTE — MR AVS SNAPSHOT
"              After Visit Summary   2017    Bean Huynh    MRN: [id number]           Patient Information     Date Of Birth          1985        Visit Information        Provider Department      2017 8:30 AM Primitivo Ames MD [facility]        Today's Diagnoses     Bone lesion    -  1       Follow-ups after your visit        Who to contact     Please call your clinic at [phone] to:    Ask questions about your health    Make or cancel appointments    Discuss your medicines    Learn about your test results    Speak to your doctor   If you have compliments or concerns about an experience at your clinic, or if you wish to file a complaint, please contact [facility] Patient Relations at [phone] or email us at [email or web address]         Additional Information About Your Visit        MyChart Information     incuBET is an electronic gateway that provides easy, online access to your medical records. With incuBET, you can request a clinic appointment, read your test results, renew a prescription or communicate with your care team.     To sign up for Same Day [employer] visit the website at [email or web address]   You will be asked to enter the access code listed below, as well as some personal information. Please follow the directions to create your username and password.     Your access code is: [email or web address]  Expires: 10/17/2017  6:30 AM     Your access code will  in 90 days. If you need help or a new code, please contact your [facility] or call [phone] for assistance.        Care EveryWhere ID     This is your Care EveryWhere ID. This could be used by other organizations to access your [facility] medical records  [id number]        Your Vitals Were     Height BMI (Body Mass Index)                1.759 m (5' 9.25\") 31.57 kg/m2           Blood Pressure from Last 3 Encounters:   17 130/80 "   04/27/17 128/78   04/27/17 116/74    Weight from Last 3 Encounters:   07/19/17 97.7 kg (215 lb 4.8 oz)   04/27/17 101.7 kg (224 lb 3.2 oz)   03/08/17 101.3 kg (223 lb 4.8 oz)              Today, you had the following     No orders found for display       Primary Care Provider Office Phone # Fax #    Piotr Zabala [phone]       [facility] [address] [address] [facility] [address]        Equal Access to Services     [facility]: Hadii isak ku hadasho Soomaali, waaxda luqadaha, qaybta kaalmada lashon, frantz espinal . So [facility] [phone].    ATENCIÓN: Si habla español, tiene a domínguez disposición servicios gratuitos de asistencia lingüística. [facility] [phone].    We comply with applicable federal civil rights laws and [place] laws. We do not discriminate on the basis of race, color, national origin, age, disability sex, sexual orientation or gender identity.            Thank you!     Thank you for choosing [facility]  for your care. Our goal is always to provide you with excellent care. Hearing back from our patients is one way we can continue to improve our services. Please take a few minutes to complete the written survey that you may receive in the mail after your visit with us. Thank you!             Your Updated Medication List - Protect others around you: Learn how to safely use, store and throw away your medicines at www.disposemymeds.org.          This list is accurate as of: 7/19/17 11:59 PM.  Always use your most recent med list.                   Brand Name Dispense Instructions for use Diagnosis    HYDROcodone-acetaminophen 5-325 MG per tablet    NORCO     Take 1-2 tablets by mouth        ibuprofen 600 MG tablet    ADVIL/MOTRIN     Take 600 mg by mouth

## 2017-07-19 NOTE — LETTER
7/19/2017       RE: Bean Huynh  [address] [address] [address]     Dear Colleague,    Thank you for referring your patient, Bean Huynh, to the [facility] at [facility]. Please see a copy of my visit note below.    I was present with the resident during the history and exam.  I discussed the case with the resident and agree with the findings as documented in the assessment and plan.    Chief complaint: Lower back pain.    History of present illness: This is a 31-year-old male with a one-month history of lower back pain. She has had lower back pain on and off over the past year but it has acutely worsened over the last month. In April of this year he was evaluated as a donor of a kidney. On a CT scan as part of that evaluation a bony lesion on his left ilium was found incidentally. She went to an emergency room as part of his workup when he first started experiencing low back pain one month ago. He was given Norco and ibuprofen. They encouraged him to develop primary care. He was seen in the line where an MRI of his SI joint was performed. Again, the lesion of his left ilium was seen and thus he was referred to our clinic. Patient has pain over his right mid back and right SI joint that radiates down his right buttocks and down the posterior aspect of his right leg to his knee. It does not radiate past his knee. He describes the pain in his leg is shooting and tingling. He denies numbness. Pain is better with walking and stretches. There is also better with Shannan which he takes occasionally and ibuprofen. He is scheduled to start physical therapy tomorrow. This made worse with working, lifting, bending, sitting, standing. He denies any change to his bladder all habits. He denies fevers. He denies weight changes. At worst the pain is rated as 10 out of 10, but it is usually 5-6 out of 10. He is scheduled to start physical therapy  tomorrow.    Past medical history: Denies    Past surgical history: Denies    Allergies: Denies    Social history: Denies smoking, she has a few alcoholic beverages per week, no other drug use    ROS: A complete ROS was performed and attached at the end of the note.    Physical Exam: This is a pleasant 31 year old male in NAD. He is appropriately conversant. His breathing is non-labored on room air. He has 5/5 strength bilaterally on hip flexion, hip abduction, hip adduction, knee flexion, knee extension, EHL, FHL, TA, GSC. SILT L3-S1. Negative straight leg raise bilaterally. He has tenderness over his R SI joint. Bilateral PRICE tests are positive in reproducing his R SI joint pain.    Imaging: CT scan from 4/27/17 and MRI from 7/13/17 were reviewed. They reveal an expansile lesion of the left ilium. It is well corticated containing sclerotic foci. Within the lesion there is lipomatous material. This is a latent-appearing lesion.    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 His back pain and his L bone mass are unrelated.    -Agree with PT referral for R SI joint pain  -L iliac bone mass is not a concern. No need for monitoring or activity restriction  - No contraindications to being a kidney donor  Answers for HPI/ROS submitted by the patient on 7/19/2017   General Symptoms: No  Skin Symptoms: No  HENT Symptoms: No  EYE SYMPTOMS: No  HEART SYMPTOMS: No  LUNG SYMPTOMS: No  INTESTINAL SYMPTOMS: No  URINARY SYMPTOMS: No  REPRODUCTIVE SYMPTOMS: No  SKELETAL SYMPTOMS: No  BLOOD SYMPTOMS: No  NERVOUS SYSTEM SYMPTOMS: No  MENTAL HEALTH SYMPTOMS: No    Elysia Juárez MD  PGY-2  Orthopaedic Surgery   [phone]        Again, thank you for allowing me to participate in the care of your patient.      Sincerely,    Primitivo Ames MD

## 2017-09-26 ENCOUNTER — TELEPHONE (OUTPATIENT)
Dept: TRANSPLANT | Facility: CLINIC | Age: 32
End: 2017-09-26

## 2017-09-26 NOTE — TELEPHONE ENCOUNTER
"I have called the patient to review with him that I will now be his Donor Coordinator.  I have him my contact information.  I asked him how effort is going to reduce weight.  He said \"I am about half way there.\"  He said he is trying to be more careful with food choices.  He stated that he has a limited time for him to be a donor due to work.  I encouraged him to continue doing all the things he is doing to reduce weight, that the donor team has criteria for his safety.  He stated understanding and further commented that the recipient is his girlfriend and that she has been a backup for a  donor recently.  He is feeling that perhaps it is better for her to have a  donor now and that he could be a kidney donor later if the first kidney does not work.    I stated that is a possibility that might play out.  I encouraged him to let us know how weight loss is going for him and offered nutrition phone consult if he should have any questions about diet choices.  He thanked me for the call.  I will plan to check on him in late Nov/early December.    "

## 2018-06-19 ENCOUNTER — OFFICE VISIT (OUTPATIENT)
Dept: FAMILY MEDICINE | Facility: CLINIC | Age: 33
End: 2018-06-19
Payer: COMMERCIAL

## 2018-06-19 VITALS
SYSTOLIC BLOOD PRESSURE: 118 MMHG | WEIGHT: 226.6 LBS | TEMPERATURE: 98 F | HEART RATE: 86 BPM | DIASTOLIC BLOOD PRESSURE: 80 MMHG | BODY MASS INDEX: 33.56 KG/M2 | HEIGHT: 69 IN | OXYGEN SATURATION: 99 %

## 2018-06-19 DIAGNOSIS — H10.33 ACUTE CONJUNCTIVITIS OF BOTH EYES, UNSPECIFIED ACUTE CONJUNCTIVITIS TYPE: Primary | ICD-10-CM

## 2018-06-19 DIAGNOSIS — J06.9 VIRAL URI: ICD-10-CM

## 2018-06-19 PROCEDURE — 99213 OFFICE O/P EST LOW 20 MIN: CPT | Performed by: PHYSICIAN ASSISTANT

## 2018-06-19 RX ORDER — POLYMYXIN B SULFATE AND TRIMETHOPRIM 1; 10000 MG/ML; [USP'U]/ML
1 SOLUTION OPHTHALMIC
Qty: 1 BOTTLE | Refills: 0 | Status: SHIPPED | OUTPATIENT
Start: 2018-06-19 | End: 2018-06-26

## 2018-06-19 NOTE — MR AVS SNAPSHOT
After Visit Summary   6/19/2018    Bean Huynh    MRN: [id number]           Patient Information     Date Of Birth          1985        Visit Information        Provider Department      6/19/2018 11:00 AM Aaseby-Aguilera, Ramona Ann, PA-C [facility]        Today's Diagnoses     Acute conjunctivitis of both eyes, unspecified acute conjunctivitis type    -  1    Viral URI          Care Instructions    (H10.33) Acute conjunctivitis of both eyes, unspecified acute conjunctivitis type  (primary encounter diagnosis)  Comment:   Plan: trimethoprim-polymyxin b (POLYTRIM) ophthalmic         solution            (J06.9,  B97.89) Viral URI  Comment:   Plan: The patient is advised to push fluids, rest, gargle warm salt water, use vaporizer or mist needed , use acetaminophen, ibuprofen as needed and Return office visit if symptoms persist or worsen.                Follow-ups after your visit        Follow-up notes from your care team     Return in about 1 year (around 6/19/2019) for Physical Exam.      Who to contact     If you have questions or need follow up information about today's clinic visit or your schedule please contact [facility] directly at [phone].  Normal or non-critical lab and imaging results will be communicated to you by MyChart, letter or phone within 4 business days after the clinic has received the results. If you do not hear from us within 7 days, please contact the clinic through MyChart or phone. If you have a critical or abnormal lab result, we will notify you by phone as soon as possible.  Submit refill requests through [employer] or call your pharmacy and they will forward the refill request to us. Please allow 3 business days for your refill to be completed.          Additional Information About Your Visit        MyChart Information     [employer] lets you send messages to your doctor, view your test results, renew your prescriptions, schedule  "appointments and more. To sign up, go to [email or web address] . Click on \"Log in\" on the left side of the screen, which will take you to the Welcome page. Then click on \"Sign up Now\" on the right side of the page.     You will be asked to enter the access code listed below, as well as some personal information. Please follow the directions to create your username and password.     Your access code is: [email or web address]  Expires: 2018 11:21 AM     Your access code will  in 90 days. If you need help or a new code, please call your [place] clinic or [phone].        Care EveryWhere ID     This is your Care EveryWhere ID. This could be used by other organizations to access your [place] medical records  [id number]        Your Vitals Were     Pulse Temperature Height Pulse Oximetry BMI (Body Mass Index)       86 98  F (36.7  C) (Oral) 5' 9.25\" (1.759 m) 99% 33.22 kg/m2        Blood Pressure from Last 3 Encounters:   18 118/80   17 130/80   17 128/78    Weight from Last 3 Encounters:   18 226 lb 9.6 oz (102.8 kg)   17 215 lb 4.8 oz (97.7 kg)   17 224 lb 3.2 oz (101.7 kg)              Today, you had the following     No orders found for display         Today's Medication Changes          These changes are accurate as of 18 11:21 AM.  If you have any questions, ask your nurse or doctor.               Start taking these medicines.        Dose/Directions    trimethoprim-polymyxin b ophthalmic solution   Commonly known as:  POLYTRIM   Used for:  Acute conjunctivitis of both eyes, unspecified acute conjunctivitis type   Started by:  Aaseby-Aguilera, Ramona Ann, PA-C        Dose:  1 drop   Apply 1 drop to eye every 3 hours for 7 days   Quantity:  1 Bottle   Refills:  0            Where to get your medicines      These medications were sent to [place] [address] [place] [id number] [address]    Hours:  Tech issues with " their phone system Phone:  [phone]     trimethoprim-polymyxin b ophthalmic solution                Primary Care Provider Office Phone # Fax #    Piotr Zabala [phone] [address]  [facility] [address]        Equal Access to Services     JESSICA JIMENEZ : Hadii isak ku hadasho Soomaali, waaxda luqadaha, qaybta kaalmada adeegyada, waxay edilia hayanayelin babar parkerderrell lasamantha rose. So [facility] [phone].    ATENCIÓN: Si habla español, tiene a domínguez disposición servicios gratuitos de asistencia lingüística. Llame al [phone].    We comply with applicable federal civil rights laws and [place] laws. We do not discriminate on the basis of race, color, national origin, age, disability, sex, sexual orientation, or gender identity.            Thank you!     Thank you for choosing [facility]  for your care. Our goal is always to provide you with excellent care. Hearing back from our patients is one way we can continue to improve our services. Please take a few minutes to complete the written survey that you may receive in the mail after your visit with us. Thank you!             Your Updated Medication List - Protect others around you: Learn how to safely use, store and throw away your medicines at www.disposemymeds.org.          This list is accurate as of 6/19/18 11:21 AM.  Always use your most recent med list.                   Brand Name Dispense Instructions for use Diagnosis    trimethoprim-polymyxin b ophthalmic solution    POLYTRIM    1 Bottle    Apply 1 drop to eye every 3 hours for 7 days    Acute conjunctivitis of both eyes, unspecified acute conjunctivitis type

## 2018-06-19 NOTE — NURSING NOTE
"Chief Complaint   Patient presents with     URI       Initial There were no vitals taken for this visit. Estimated body mass index is 31.57 kg/(m^2) as calculated from the following:    Height as of 7/19/17: 5' 9.25\" (1.759 m).    Weight as of 7/19/17: 215 lb 4.8 oz (97.7 kg).  Medication Reconciliation: complete      Health Maintenance addressed:  Tetanus     Possibly completing today per provider review.    JORDAN Horton      "

## 2018-06-19 NOTE — PROGRESS NOTES
"  SUBJECTIVE:   Bean Huynh is a 32 year old male who presents to clinic today for the following health issues:      Acute Illness   Acute illness concerns: pink eye, congestion and cough   Onset: WEEK AGO    Fever: no     Chills/Sweats: no     Headache (location?): no     Sinus Pressure:no    Conjunctivitis:  YES: both    Ear Pain: no    Rhinorrhea: YES    Congestion: YES    Sore Throat: YES     Cough: YES-productive of green sputum    Wheeze: YES    Decreased Appetite: no     Nausea: no     Vomiting: no     Diarrhea:  no     Dysuria/Freq.: no     Fatigue/Achiness: NO    Sick/Strep Exposure: YES- GIRL FRIEND     Therapies Tried and outcome: NIQUILL,TYLENOL          Problem list and histories reviewed & adjusted, as indicated.  Additional history: as documented    Current Outpatient Prescriptions   Medication Sig Dispense Refill     trimethoprim-polymyxin b (POLYTRIM) ophthalmic solution Apply 1 drop to eye every 3 hours for 7 days 1 Bottle 0     BP Readings from Last 3 Encounters:   06/19/18 118/80   04/27/17 130/80   04/27/17 128/78    Wt Readings from Last 3 Encounters:   06/19/18 226 lb 9.6 oz (102.8 kg)   07/19/17 215 lb 4.8 oz (97.7 kg)   04/27/17 224 lb 3.2 oz (101.7 kg)                    Reviewed and updated as needed this visit by clinical staff  Tobacco  Allergies  Med Hx  Surg Hx  Fam Hx  Soc Hx      Reviewed and updated as needed this visit by Provider         ROS:  Constitutional, HEENT, cardiovascular, pulmonary, gi and gu systems are negative, except as otherwise noted.    OBJECTIVE:                                                    /80 (BP Location: Right arm, Patient Position: Chair, Cuff Size: Adult Large)  Pulse 86  Temp 98  F (36.7  C) (Oral)  Ht 5' 9.25\" (1.759 m)  Wt 226 lb 9.6 oz (102.8 kg)  SpO2 99%  BMI 33.22 kg/m2  Body mass index is 33.22 kg/(m^2).  GENERAL APPEARANCE: healthy, alert and no distress  HENT: ear canals and TM's normal and nose and mouth without ulcers " or lesions  RESP: lungs clear to auscultation - no rales, rhonchi or wheezes  CV: regular rates and rhythm, normal S1 S2, no S3 or S4 and no murmur, click or rub  LYMPHATICS: no cervical adenopathy    Diagnostic test results:  Diagnostic Test Results:  none      ASSESSMENT/PLAN:                                                    1. Acute conjunctivitis of both eyes, unspecified acute conjunctivitis type    - trimethoprim-polymyxin b (POLYTRIM) ophthalmic solution; Apply 1 drop to eye every 3 hours for 7 days  Dispense: 1 Bottle; Refill: 0    2. Viral URI        Patient Instructions   (H10.33) Acute conjunctivitis of both eyes, unspecified acute conjunctivitis type  (primary encounter diagnosis)  Comment:   Plan: trimethoprim-polymyxin b (POLYTRIM) ophthalmic         solution            (J06.9,  B97.89) Viral URI  Comment:   Plan: The patient is advised to push fluids, rest, gargle warm salt water, use vaporizer or mist needed , use acetaminophen, ibuprofen as needed and Return office visit if symptoms persist or worsen.            Ramona Ann Aaseby-Aguilera, PA-C  [facility]

## 2018-06-19 NOTE — PATIENT INSTRUCTIONS
(H10.33) Acute conjunctivitis of both eyes, unspecified acute conjunctivitis type  (primary encounter diagnosis)  Comment:   Plan: trimethoprim-polymyxin b (POLYTRIM) ophthalmic         solution            (J06.9,  B97.89) Viral URI  Comment:   Plan: The patient is advised to push fluids, rest, gargle warm salt water, use vaporizer or mist needed , use acetaminophen, ibuprofen as needed and Return office visit if symptoms persist or worsen.

## 2018-09-04 ENCOUNTER — OFFICE VISIT (OUTPATIENT)
Dept: FAMILY MEDICINE | Facility: CLINIC | Age: 33
End: 2018-09-04
Payer: COMMERCIAL

## 2018-09-04 VITALS
HEART RATE: 80 BPM | TEMPERATURE: 98.7 F | BODY MASS INDEX: 32.97 KG/M2 | SYSTOLIC BLOOD PRESSURE: 138 MMHG | WEIGHT: 224.9 LBS | RESPIRATION RATE: 16 BRPM | DIASTOLIC BLOOD PRESSURE: 86 MMHG

## 2018-09-04 DIAGNOSIS — Z23 NEED FOR PROPHYLACTIC VACCINATION WITH TETANUS-DIPHTHERIA (TD): ICD-10-CM

## 2018-09-04 DIAGNOSIS — Z23 NEED FOR PROPHYLACTIC VACCINATION AND INOCULATION AGAINST INFLUENZA: ICD-10-CM

## 2018-09-04 DIAGNOSIS — B37.89 CANDIDA RASH OF GROIN: Primary | ICD-10-CM

## 2018-09-04 PROCEDURE — 90472 IMMUNIZATION ADMIN EACH ADD: CPT | Performed by: FAMILY MEDICINE

## 2018-09-04 PROCEDURE — 90471 IMMUNIZATION ADMIN: CPT | Performed by: FAMILY MEDICINE

## 2018-09-04 PROCEDURE — 90686 IIV4 VACC NO PRSV 0.5 ML IM: CPT | Performed by: FAMILY MEDICINE

## 2018-09-04 PROCEDURE — 99213 OFFICE O/P EST LOW 20 MIN: CPT | Mod: 25 | Performed by: FAMILY MEDICINE

## 2018-09-04 PROCEDURE — 90715 TDAP VACCINE 7 YRS/> IM: CPT | Performed by: FAMILY MEDICINE

## 2018-09-04 RX ORDER — NYSTATIN AND TRIAMCINOLONE ACETONIDE 100000; 1 [USP'U]/G; MG/G
OINTMENT TOPICAL 2 TIMES DAILY
Qty: 30 G | Refills: 1 | Status: SHIPPED | OUTPATIENT
Start: 2018-09-04

## 2018-09-04 RX ORDER — KETOCONAZOLE 200 MG/1
400 TABLET ORAL DAILY
Qty: 14 TABLET | Refills: 0 | Status: SHIPPED | OUTPATIENT
Start: 2018-09-04 | End: 2021-04-21

## 2018-09-04 ASSESSMENT — ENCOUNTER SYMPTOMS: CONSTITUTIONAL NEGATIVE: 1

## 2018-09-04 NOTE — MR AVS SNAPSHOT
"              After Visit Summary   9/4/2018    Bean Huynh    MRN: [id number]           Patient Information     Date Of Birth          1985        Visit Information        Provider Department      9/4/2018 1:00 PM Song Phillips MD [facility]        Today's Diagnoses     Candida rash of groin    -  1    Need for prophylactic vaccination with tetanus-diphtheria (TD)        Need for prophylactic vaccination and inoculation against influenza           Follow-ups after your visit        Follow-up notes from your care team     Return in about 1 month (around 10/4/2018), or if symptoms worsen or fail to improve.      Who to contact     If you have questions or need follow up information about today's clinic visit or your schedule please contact [facility] directly at [phone].  Normal or non-critical lab and imaging results will be communicated to you by MyChart, letter or phone within 4 business days after the clinic has received the results. If you do not hear from us within 7 days, please contact the clinic through MyChart or phone. If you have a critical or abnormal lab result, we will notify you by phone as soon as possible.  Submit refill requests through [employer] or call your pharmacy and they will forward the refill request to us. Please allow 3 business days for your refill to be completed.          Additional Information About Your Visit        MyChart Information     [employer] lets you send messages to your doctor, view your test results, renew your prescriptions, schedule appointments and more. To sign up, go to [email or web address] . Click on \"Log in\" on the left side of the screen, which will take you to the Welcome page. Then click on \"Sign up Now\" on the right side of the page.     You will be asked to enter the access code listed below, as well as some personal information. Please follow the directions to create your username and password.   "   Your access code is: [email or web address]  Expires: 12/3/2018 12:47 PM     Your access code will  in 90 days. If you need help or a new code, please call your [place] clinic or [phone].        Care EveryWhere ID     This is your Care EveryWhere ID. This could be used by other organizations to access your [place] medical records  [id number]        Your Vitals Were     Pulse Temperature Respirations BMI (Body Mass Index)          80 98.7  F (37.1  C) (Oral) 16 32.97 kg/m2         Blood Pressure from Last 3 Encounters:   18 138/86   18 118/80   17 130/80    Weight from Last 3 Encounters:   18 224 lb 14.4 oz (102 kg)   18 226 lb 9.6 oz (102.8 kg)   17 215 lb 4.8 oz (97.7 kg)              We Performed the Following     FLU VACCINE, SPLIT VIRUS, IM (QUADRIVALENT) [96153]- >3 YRS     TDAP VACCINE (ADACEL)     Vaccine Administration, Initial [53105]          Today's Medication Changes          These changes are accurate as of 18  1:20 PM.  If you have any questions, ask your nurse or doctor.               Start taking these medicines.        Dose/Directions    ketoconazole 200 MG tablet   Commonly known as:  NIZORAL   Used for:  Candida rash of groin   Started by:  Song Phillips MD        Dose:  400 mg   Take 2 tablets (400 mg) by mouth daily   Quantity:  14 tablet   Refills:  0       nystatin-triamcinolone ointment   Commonly known as:  MYCOLOG   Used for:  Candida rash of groin   Started by:  Song Phillips MD        Apply topically 2 times daily   Quantity:  30 g   Refills:  1            Where to get your medicines      These medications were sent to [facility]/pharmacy #[id number] - [place] - [address]  [address], [facility] [address]     Phone:  [phone]     ketoconazole 200 MG tablet    nystatin-triamcinolone ointment                Primary Care Provider Office Phone # Fax #    Piotr Zabala [phone] [address]  [facility] [address]        Equal Access to Services     JESSICA JIMENEZ : Hadii aad ku hadheatherkapil Thayer, warodrickda marlin, qaybta cherimataisha oates, frantz esquedadevderrell rose. So [facility] [place] [phone].    ATENCIÓN: Si habla español, tiene a domínguez disposición servicios gratuitos de asistencia lingüística. Llame al [phone].    We comply with applicable federal civil rights laws and [place] laws. We do not discriminate on the basis of race, color, national origin, age, disability, sex, sexual orientation, or gender identity.            Thank you!     Thank you for choosing [facility]  for your care. Our goal is always to provide you with excellent care. Hearing back from our patients is one way we can continue to improve our services. Please take a few minutes to complete the written survey that you may receive in the mail after your visit with us. Thank you!             Your Updated Medication List - Protect others around you: Learn how to safely use, store and throw away your medicines at www.disposemymeds.org.          This list is accurate as of 9/4/18  1:20 PM.  Always use your most recent med list.                   Brand Name Dispense Instructions for use Diagnosis    ketoconazole 200 MG tablet    NIZORAL    14 tablet    Take 2 tablets (400 mg) by mouth daily    Candida rash of groin       nystatin-triamcinolone ointment    MYCOLOG    30 g    Apply topically 2 times daily    Candida rash of groin

## 2018-12-24 NOTE — MR AVS SNAPSHOT
"              After Visit Summary   2017    Bean Huynh    MRN: [id number]           Patient Information     Date Of Birth          1985        Visit Information        Provider Department      2017 10:00 AM Ashley Daugherty LICSW [facility] Transplant        Today's Diagnoses     Transplant donor evaluation           Follow-ups after your visit        Who to contact     If you have questions or need follow up information about today's clinic visit or your schedule please contact [facility] TRANSPL[facility] directly at [phone].  Normal or non-critical lab and imaging results will be communicated to you by [employer], letter or phone within 4 business days after the clinic has received the results. If you do not hear from us within 7 days, please contact the clinic through [employer] or phone. If you have a critical or abnormal lab result, we will notify you by phone as soon as possible.  Submit refill requests through [employer] or call your pharmacy and they will forward the refill request to us. Please allow 3 business days for your refill to be completed.          Additional Information About Your Visit        MyChart Information     [employer] lets you send messages to your doctor, view your test results, renew your prescriptions, schedule appointments and more. To sign up, go to [email or web address] . Click on \"Log in\" on the left side of the screen, which will take you to the Welcome page. Then click on \"Sign up Now\" on the right side of the page.     You will be asked to enter the access code listed below, as well as some personal information. Please follow the directions to create your username and password.     Your access code is: [email or web address]  Expires: 2017 12:32 PM     Your access code will  in 90 days. If you need help or a new code, please call your [place] clinic or [phone].        Care EveryWhere ID     This is your Care EveryWhere ID. This " could be used by other organizations to access your [place] medical records  [id number]         Blood Pressure from Last 3 Encounters:   04/27/17 130/80   04/27/17 128/78   04/27/17 116/74    Weight from Last 3 Encounters:   04/27/17 101.7 kg (224 lb 3.2 oz)   03/08/17 101.3 kg (223 lb 4.8 oz)   06/28/16 98.9 kg (218 lb)              We Performed the Following      REFERRAL          Today's Medication Changes          These changes are accurate as of: 4/27/17 11:59 PM.  If you have any questions, ask your nurse or doctor.               Stop taking these medicines if you haven't already. Please contact your care team if you have questions.     doxycycline 100 MG tablet   Commonly known as:  VIBRA-TABS           guaiFENesin-codeine 100-10 MG/5ML Soln solution   Commonly known as:  ROBITUSSIN AC           nystatin 036819 UNIT/GM Powd   Commonly known as:  MYCOSTATIN                    Primary Care Provider Office Phone # Fax #    Piotr Zabala [phone]       [facility] [address] [address] [facility] [address]        Thank you!     Thank you for choosing [facility] TRANSPL[facility]  for your care. Our goal is always to provide you with excellent care. Hearing back from our patients is one way we can continue to improve our services. Please take a few minutes to complete the written survey that you may receive in the mail after your visit with us. Thank you!             Your Updated Medication List - Protect others around you: Learn how to safely use, store and throw away your medicines at www.disposemymeds.org.      Notice  As of 4/27/2017 11:59 PM    You have not been prescribed any medications.       discharged home by PA.

## 2019-06-25 ENCOUNTER — OFFICE VISIT (OUTPATIENT)
Dept: INTERNAL MEDICINE | Facility: CLINIC | Age: 34
End: 2019-06-25
Payer: COMMERCIAL

## 2019-06-25 VITALS
BODY MASS INDEX: 32.64 KG/M2 | TEMPERATURE: 98.7 F | HEIGHT: 70 IN | SYSTOLIC BLOOD PRESSURE: 128 MMHG | HEART RATE: 83 BPM | WEIGHT: 228 LBS | RESPIRATION RATE: 18 BRPM | OXYGEN SATURATION: 97 % | DIASTOLIC BLOOD PRESSURE: 100 MMHG

## 2019-06-25 DIAGNOSIS — H10.32 ACUTE CONJUNCTIVITIS OF LEFT EYE, UNSPECIFIED ACUTE CONJUNCTIVITIS TYPE: ICD-10-CM

## 2019-06-25 DIAGNOSIS — R30.0 DYSURIA: Primary | ICD-10-CM

## 2019-06-25 LAB
ALBUMIN UR-MCNC: ABNORMAL MG/DL
APPEARANCE UR: ABNORMAL
BACTERIA #/AREA URNS HPF: ABNORMAL /HPF
BILIRUB UR QL STRIP: NEGATIVE
COLOR UR AUTO: YELLOW
GLUCOSE UR STRIP-MCNC: NEGATIVE MG/DL
HGB UR QL STRIP: NEGATIVE
KETONES UR STRIP-MCNC: NEGATIVE MG/DL
LEUKOCYTE ESTERASE UR QL STRIP: ABNORMAL
NITRATE UR QL: NEGATIVE
PH UR STRIP: 7 PH (ref 5–7)
RBC #/AREA URNS AUTO: ABNORMAL /HPF
SOURCE: ABNORMAL
SP GR UR STRIP: 1.01 (ref 1–1.03)
UROBILINOGEN UR STRIP-ACNC: 0.2 EU/DL (ref 0.2–1)
WBC #/AREA URNS AUTO: ABNORMAL /HPF

## 2019-06-25 PROCEDURE — 81001 URINALYSIS AUTO W/SCOPE: CPT | Performed by: PHYSICIAN ASSISTANT

## 2019-06-25 PROCEDURE — 87491 CHLMYD TRACH DNA AMP PROBE: CPT | Performed by: PHYSICIAN ASSISTANT

## 2019-06-25 PROCEDURE — 87086 URINE CULTURE/COLONY COUNT: CPT | Performed by: PHYSICIAN ASSISTANT

## 2019-06-25 PROCEDURE — 87591 N.GONORRHOEAE DNA AMP PROB: CPT | Performed by: PHYSICIAN ASSISTANT

## 2019-06-25 PROCEDURE — 99214 OFFICE O/P EST MOD 30 MIN: CPT | Performed by: PHYSICIAN ASSISTANT

## 2019-06-25 RX ORDER — TOBRAMYCIN 3 MG/ML
1-2 SOLUTION/ DROPS OPHTHALMIC 4 TIMES DAILY
Qty: 1 BOTTLE | Refills: 0 | Status: SHIPPED | OUTPATIENT
Start: 2019-06-25 | End: 2021-04-21

## 2019-06-25 ASSESSMENT — MIFFLIN-ST. JEOR: SCORE: 1976.48

## 2019-06-25 NOTE — PROGRESS NOTES
"Subjective     Bean Huynh is a 33 year old male who presents to clinic today for the following health issues:    HPI   Genitourinary - Male  Onset: 1 week    Description:   Dysuria (painful urination): YES- worse in AM  Hematuria (blood in urine): no   Frequency: no   Are you urinating at night : no   Hesitancy (delay in urine): YES  Retention (unable to empty): no   Decrease in urinary flow: YES- states could be self trying to avoid discomfort  Incontinence: no     Progression of Symptoms:  improving    Accompanying Signs & Symptoms:  Fever: no   Back/Flank pain: no   Urethral discharge: no   Testicle lumps/masses/pain: no   Nausea and/or vomiting: no   Abdominal pain: no     History:   History of frequent UTI's: no   History of kidney stones: no   History of hernias: no   Personal or Family history of Prostate problems: no  Sexually active: YES    Precipitating factors:   None    Alleviating factors:  Increase fluids    Also concerned about redness in left eye x3 days. Had lasik completed in Feb of this year, unsure if related to that. Used some drops from the recovery of that procedure and it caused increased discomfort.     -------------------------------------        -------------------------------------  Reviewed and updated as needed this visit by Provider  Allergies  Meds         Review of Systems   ROS COMP: Constitutional, HEENT, cardiovascular, pulmonary, gi and gu systems are negative, except as otherwise noted.      Objective    BP (!) 128/100 (BP Location: Left arm, Patient Position: Chair, Cuff Size: Adult Large)   Pulse 83   Temp 98.7  F (37.1  C) (Oral)   Resp 18   Ht 1.772 m (5' 9.75\")   Wt 103.4 kg (228 lb)   SpO2 97%   BMI 32.95 kg/m    Body mass index is 32.95 kg/m .  Physical Exam   GENERAL: healthy, alert and no distress  EYES: PERRL, EOMI and conjunctiva/corneas- conjunctival injection OS  NECK: no adenopathy, no asymmetry, masses, or scars and thyroid normal to " palpation  RESP: lungs clear to auscultation - no rales, rhonchi or wheezes  CV: regular rate and rhythm, normal S1 S2, no S3 or S4, no murmur, click or rub, no peripheral edema and peripheral pulses strong  ABDOMEN: soft, nontender, no hepatosplenomegaly, no masses and bowel sounds normal  SKIN: no suspicious lesions or rashes    Diagnostic Test Results:  Results for orders placed or performed in visit on 06/25/19 (from the past 24 hour(s))   UA reflex to Microscopic and Culture   Result Value Ref Range    Color Urine Yellow     Appearance Urine Slightly Cloudy     Glucose Urine Negative NEG^Negative mg/dL    Bilirubin Urine Negative NEG^Negative    Ketones Urine Negative NEG^Negative mg/dL    Specific Gravity Urine 1.015 1.003 - 1.035    Blood Urine Negative NEG^Negative    pH Urine 7.0 5.0 - 7.0 pH    Protein Albumin Urine Trace (A) NEG^Negative mg/dL    Urobilinogen Urine 0.2 0.2 - 1.0 EU/dL    Nitrite Urine Negative NEG^Negative    Leukocyte Esterase Urine Trace (A) NEG^Negative    Source Midstream Urine    Urine Microscopic   Result Value Ref Range    WBC Urine 5-10 (A) OTO5^0 - 5 /HPF    RBC Urine O - 2 OTO2^O - 2 /HPF    Bacteria Urine Few (A) NEG^Negative /HPF           Assessment & Plan     1. Dysuria  Additional labs are pending  Reviewed results today   Possible UTI vs urethritis   Will notify of final and treatment pain in 2-3 days   - Chlamydia trachomatis PCR  - Neisseria gonorrhoeae PCR  - UA reflex to Microscopic and Culture  - Urine Microscopic  - Urine Culture Aerobic Bacterial    2. Acute conjunctivitis of left eye, unspecified acute conjunctivitis type  Review viral vs bacterial  Warm compresses, moisturizing drops  If not improving then to fill abx drops    - tobramycin (TOBREX) 0.3 % ophthalmic solution; Place 1-2 drops Into the left eye 4 times daily Ok to fill in 2-3 days if not improving  Dispense: 1 Bottle; Refill: 0     BMI:   Estimated body mass index is 32.95 kg/m  as calculated from  "the following:    Height as of this encounter: 1.772 m (5' 9.75\").    Weight as of this encounter: 103.4 kg (228 lb).   Weight management plan: Patient was referred to their PCP to discuss a diet and exercise plan.      25 minutes spent with patient > 50% of time on counseling and plan of care    See Patient Instructions    Return in about 1 week (around 7/2/2019) for recheck if not improving, regular primary provider.    Bri Spence PA-C  [facility]      "

## 2019-06-26 ENCOUNTER — TELEPHONE (OUTPATIENT)
Dept: INTERNAL MEDICINE | Facility: CLINIC | Age: 34
End: 2019-06-26

## 2019-06-26 DIAGNOSIS — N34.2 URETHRITIS: Primary | ICD-10-CM

## 2019-06-26 LAB
BACTERIA SPEC CULT: NO GROWTH
C TRACH DNA SPEC QL NAA+PROBE: NEGATIVE
N GONORRHOEA DNA SPEC QL NAA+PROBE: NEGATIVE
SPECIMEN SOURCE: NORMAL

## 2019-06-26 RX ORDER — DOXYCYCLINE 100 MG/1
100 CAPSULE ORAL 2 TIMES DAILY
Qty: 20 CAPSULE | Refills: 0 | Status: SHIPPED | OUTPATIENT
Start: 2019-06-26 | End: 2019-07-06

## 2019-06-26 NOTE — TELEPHONE ENCOUNTER
Lab results are all back  Urine culture - no infection  Std screening tests negative.  Suspected urethritis given symptoms and treatment with antibiotics sent to pharmacy for him I picked our pharmacy at [facility] but if patient wants to go somewhere else then call in to that pharmacy

## 2019-06-28 ENCOUNTER — MYC MEDICAL ADVICE (OUTPATIENT)
Dept: INTERNAL MEDICINE | Facility: CLINIC | Age: 34
End: 2019-06-28

## 2019-06-28 NOTE — TELEPHONE ENCOUNTER
Patient reports since starting eye drops eye is black with red cuts on top of his Eyelids.     Please advise.     Alyssa ZACARIAS RN, BSN, PHN

## 2019-06-29 ENCOUNTER — OFFICE VISIT (OUTPATIENT)
Dept: URGENT CARE | Facility: URGENT CARE | Age: 34
End: 2019-06-29
Payer: COMMERCIAL

## 2019-06-29 VITALS
RESPIRATION RATE: 16 BRPM | HEIGHT: 70 IN | DIASTOLIC BLOOD PRESSURE: 90 MMHG | HEART RATE: 85 BPM | TEMPERATURE: 98.4 F | SYSTOLIC BLOOD PRESSURE: 140 MMHG | WEIGHT: 228.9 LBS | BODY MASS INDEX: 32.77 KG/M2 | OXYGEN SATURATION: 98 %

## 2019-06-29 DIAGNOSIS — T14.8XXA BRUISE: ICD-10-CM

## 2019-06-29 DIAGNOSIS — H10.13 ALLERGIC CONJUNCTIVITIS, BILATERAL: Primary | ICD-10-CM

## 2019-06-29 PROCEDURE — 99214 OFFICE O/P EST MOD 30 MIN: CPT | Performed by: PHYSICIAN ASSISTANT

## 2019-06-29 RX ORDER — CETIRIZINE HYDROCHLORIDE 10 MG/1
10 TABLET ORAL EVERY EVENING
Qty: 30 TABLET | Refills: 1 | Status: SHIPPED | OUTPATIENT
Start: 2019-06-29 | End: 2021-04-21

## 2019-06-29 ASSESSMENT — MIFFLIN-ST. JEOR: SCORE: 1984.53

## 2019-06-29 NOTE — NURSING NOTE
VISION   No corrective lenses  Tool used: Stephan   Right eye:        10/10 (20/20)  Left eye:          10/10 (20/20)  Visual Acuity: Chastity Berkowitz LPN

## 2019-06-29 NOTE — PATIENT INSTRUCTIONS
(H10.13) Allergic conjunctivitis, bilateral  (primary encounter diagnosis)  Comment: with possible allergic reaction to tobramycin drops as well  Plan: cetirizine (ZYRTEC) 10 MG tablet, ketotifen         (ZADITOR/REFRESH ANTI-ITCH) 0.025 % ophthalmic         solution          Stop tobramycin drops    (T14.8XXA) Bruise  Comment: of left upper and lower eyelids without any bony tenderness or soft tissue tenderness.    Plan: warm compress to area for 10-15 minutes 3-4 times a day    Declined orbit xray as not complaining of pain and denies trauma

## 2019-06-29 NOTE — PROGRESS NOTES
Patient presents with:  Eye Problem: eye inflammation, was seen on Monday and started drops but not improving     SUBJECTIVE:  Chief Complaint:   Chief Complaint   Patient presents with     Eye Problem     eye inflammation, was seen on Monday and started drops but not improving     History of Present Illness:  Bean Huynh is a 33 year old male who presents complaining of:  1) bruising of his upper and lower eyelids and some eye redness.      Denies any pain.    Contact wearer : No     started doxycycline for a non specific urethritis on 6/26/19   Started tobramycin on 6/26/19 for a red eye, never had purulent drainage, only slight crusting and some watery and itchy eye and    Denies any eye pain or blurred vision.     Denies any trauma.      Does not take aspirin or other anti-platelet aggregating meds.      Urine symptoms improved on Doxy.      Bruising of left upper and lower lids started 6/27/19 evening.      Denies any eye pain or lid pain      SH: Wife is a transplant patient here with him today.      Had lasix in February 2019.        Past Medical History:   Diagnosis Date     NO ACTIVE PROBLEMS      Current Outpatient Medications   Medication Sig Dispense Refill     cetirizine (ZYRTEC) 10 MG tablet Take 1 tablet (10 mg) by mouth every evening 30 tablet 1     doxycycline hyclate (VIBRAMYCIN) 100 MG capsule Take 1 capsule (100 mg) by mouth 2 times daily for 10 days 20 capsule 0     ketoconazole (NIZORAL) 200 MG tablet Take 2 tablets (400 mg) by mouth daily 14 tablet 0     ketotifen (ZADITOR/REFRESH ANTI-ITCH) 0.025 % ophthalmic solution Place 1 drop into both eyes 2 times daily 1 Bottle 0     nystatin-triamcinolone (MYCOLOG) ointment Apply topically 2 times daily 30 g 1     tobramycin (TOBREX) 0.3 % ophthalmic solution Place 1-2 drops Into the left eye 4 times daily Ok to fill in 2-3 days if not improving 1 Bottle 0        ROS:  CONSTITUTIONAL:NEGATIVE for fever, chills, change in  "weight  INTEGUMENTARY/SKIN: as per HPI  EYES: as per HPI  ENT/MOUTH: NEGATIVE for ear, mouth and throat problems  RESP:NEGATIVE for significant cough or SOB  CV: NEGATIVE for chest pain, palpitations or peripheral edema  GI: NEGATIVE for nausea, abdominal pain, heartburn, or change in bowel habits  MUSCULOSKELETAL: NEGATIVE for significant arthralgias or myalgia  NEURO: NEGATIVE for weakness, dizziness or paresthesias  HEME/ALLERGY/IMMUNE: NEGATIVE for bleeding problems  Review of systems negative except as stated above.    OBJECTIVE:  /90   Pulse 85   Temp 98.4  F (36.9  C)   Resp 16   Ht 1.778 m (5' 10\")   Wt 103.8 kg (228 lb 14.4 oz)   SpO2 98%   BMI 32.84 kg/m    General: no acute distress  Eye exam: right eye normal lid,  cornea, pupil and fundus, left eye normal lid,  cornea, pupil and fundus.  Both conjunctiva have mild injection with palpebral conjunctival cobblestoning.    No drainage.    No bony tenderness of orbits  Left upper lid and lower lid with small curved ecchymosis in lid line contu  Ears: normal canals, TMs bilaterally, normal TM mobility  Nose: NORMAL - no drainage. Boggy blue turbinates.    Neck: supple, non-tender, free range of motion, no adenopathy  Heart: NORMAL - regular rate and rhythm without murmur.  Lungs: normal and clear to auscultation      (H10.13) Allergic conjunctivitis, bilateral  (primary encounter diagnosis)  Comment: with possible allergic reaction to tobramycin drops as well  Plan: cetirizine (ZYRTEC) 10 MG tablet, ketotifen         (ZADITOR/REFRESH ANTI-ITCH) 0.025 % ophthalmic         solution          Stop tobramycin drops    (T14.8XXA) Bruise  Comment: of left upper and lower eyelids without any bony tenderness or soft tissue tenderness.    Plan: warm compress to area for 10-15 minutes 3-4 times a day    Patient seen and discussed with Dr. Farrar    Declined orbit xray as not complaining of pain and denies trauma    Follow up with ophthalmology or PCP should " symptoms persist or worsen.      Patient expresses understanding and agreement with the assessment and plan as above.

## 2019-07-25 DIAGNOSIS — Z31.41 FERTILITY TESTING: Primary | ICD-10-CM

## 2019-08-01 DIAGNOSIS — Z31.41 FERTILITY TESTING: ICD-10-CM

## 2019-08-01 PROCEDURE — 89322 SEMEN ANAL STRICT CRITERIA: CPT

## 2019-08-05 LAB
ABNORMAL SPERM: 89 MORPHOLOGY
ABSTINENCE DAYS: 3 DAYS (ref 2–7)
AGGLUTINATION: YES YES/NO
ANALYSIS TEMP - CENTIGRADE: 23 CENTIGRADE
CELL FRAGMENTS: NORMAL %
COLLECTION METHOD: NORMAL
COLLECTION SITE: NORMAL
CONSENT TO RELEASE TO PARTNER: YES
HEAD DEFECT: 86
IMMATURE SPERM: NORMAL %
IMMOTILE: 31 %
LAB RECEIPT TIME: NORMAL
LIQUEFIED: YES YES/NO
MIDPIECE DEFECT: 25
NON-PROGRESSIVE MOTILITY: 5 %
NORMAL SPERM: 11 % NORMAL FORMS (ref 4–?)
PROGRESSIVE MOTILITY: 63 % (ref 32–?)
ROUND CELLS: 0 MILLION/ML (ref ?–2)
SPECIMEN CONCENTRATION: 68 MILLION/ML (ref 15–?)
SPECIMEN PH: 7.2 PH (ref 7.2–?)
SPECIMEN TYPE: NORMAL
SPECIMEN VOL UR: 5.2 ML (ref 1.5–?)
TAIL DEFECT: 6
TIME OF ANALYSIS: NORMAL
TOTAL NUMBER: 354 MILLION (ref 39–?)
TOTAL PROGRESSIVE MOTILE: 223 MILLION (ref 15.6–?)
VISCOUS: NO YES/NO
VITALITY: NORMAL % (ref 58–?)
WBC SPECIMEN: NORMAL %

## 2020-01-06 ENCOUNTER — IMMUNIZATION (OUTPATIENT)
Dept: NURSING | Facility: CLINIC | Age: 35
End: 2020-01-06
Payer: COMMERCIAL

## 2020-01-06 PROCEDURE — 90686 IIV4 VACC NO PRSV 0.5 ML IM: CPT

## 2020-01-06 PROCEDURE — 90471 IMMUNIZATION ADMIN: CPT

## 2020-03-18 ENCOUNTER — VIRTUAL VISIT (OUTPATIENT)
Dept: FAMILY MEDICINE | Facility: OTHER | Age: 35
End: 2020-03-18

## 2020-03-18 NOTE — PROGRESS NOTES
"Date: 2020 17:33:48  Clinician: Suzi Shepherd  Clinician NPI: [id number]  Patient: Bean Huynh  Patient : 1985  Patient Address: [address] [place] [address]  Patient Phone: [phone]  Visit Protocol: URI  Patient Summary:  Bean is a 34 year old ( : 1985 ) male who initiated a Visit for COVID-19 (Coronavirus) evaluation and screening. When asked the question \"Please sign me up to receive news, health information and promotions. \", Bean responded \"No\".    Bean states his symptoms started 1-2 days ago.   His symptoms consist of a cough and rhinitis.   Symptom details     Nasal secretions: The color of his mucus is clear.    Cough: Bean coughs a few times an hour and his cough is not more bothersome at night. Phlegm does not come into his throat when he coughs. He does not believe his cough is caused by post-nasal drip.      Bean denies having wheezing, sore throat, nasal congestion, fever, ear pain, malaise, headache, enlarged lymph nodes, facial pain or pressure, myalgias, chills, and teeth pain. He also denies taking antibiotic medication for the symptoms and having recent facial or sinus surgery in the past 60 days. He is not experiencing dyspnea.   Precipitating events  He has not recently been exposed to someone with influenza. Bean has been in close contact with the following high risk individuals: immunocompromised people and pregnant women.   Pertinent COVID-19 (Coronavirus) information  Bean has not traveled internationally or to the areas where COVID-19 (Coronavirus) is widespread in the last 14 days before the start of his symptoms.   Bean has not had a close contact with a laboratory-confirmed COVID-19 patient within 14 days of symptom onset. He also has not had a close contact with a suspected COVID-19 patient within 14 days of symptom onset.   Bean is not a healthcare worker and does not work in a healthcare facility.   " Pertinent medical history  Bean does not need a return to work/school note.   Weight: 210 lbs   Bean does not smoke or use smokeless tobacco.   Weight: 210 lbs    MEDICATIONS: No current medications, ALLERGIES: NKDA  Clinician Response:  Dear Bean,   Based on the information you have provided, you do have symptoms that are consistent with Coronavirus (COVID-19).  The coronavirus causes mild to severe respiratory illness with the most common symptoms including fever, cough and difficulty breathing. Unfortunately, many viruses cause similar symptoms and it can be difficult to distinguish between viruses, especially in mild cases, so we are presuming that anyone with cough or fever has coronavirus at this time.  Coronavirus/COVID-19 has reached the point of community spread in [place], meaning that we are finding the virus in people with no known exposure risk for tuan the virus. Given the increasing commonness of coronavirus in the community we are no longer testing patients who are not critically ill.  If you are a health care worker, you should refer to your employee health office for instructions about returning to work.  For everyone else who has cough or fever, you should assume you are infected with coronavirus. Accordingly, you should self-quarantine for seven days from the first day your symptoms started OR 72 hours after your cough and fever completely resolve - WHICHEVER is LONGER. You should call if you find increasing shortness of breath, wheezing or sustained fever above 101.5. If you are significantly short of breath or experience chest pain you should call 911 or report to the nearest emergency department for urgent evaluation.    Isolate yourself at home.   Do Not allow any visitors  Do Not go to work or school  Do Not go to [facility],  centers, shopping, or other public places.  Do Not shake hands.  Avoid close contact with others (hugging, kissing).   Protect Others:    Cover  Your Mouth and Nose with a mask, disposable tissue or wash cloth to avoid spreading germs to others.  Wash your hands and face frequently with soap and water.   If you develop significant shortness of breath that prevents you from doing normal activities, please call 911 or proceed to the nearest emergency room and alert them immediately that you have been in self-isolation for possible coronavirus.   For more information about COVID19 and options for caring for yourself at home, please visit the CDC website at https://www.cdc.gov/coronavirus/2019-ncov/about/steps-when-sick.htmlFor more options for care at [facility], please visit our website at [email or web address]     Diagnosis: Acute upper respiratory infection, unspecified  Diagnosis ICD: J06.9

## 2020-04-08 ENCOUNTER — VIRTUAL VISIT (OUTPATIENT)
Dept: FAMILY MEDICINE | Facility: CLINIC | Age: 35
End: 2020-04-08
Payer: COMMERCIAL

## 2020-04-08 DIAGNOSIS — B37.89 CANDIDA RASH OF GROIN: Primary | ICD-10-CM

## 2020-04-08 PROCEDURE — 99213 OFFICE O/P EST LOW 20 MIN: CPT | Mod: TEL | Performed by: FAMILY MEDICINE

## 2020-04-08 RX ORDER — NYSTATIN 100000 [USP'U]/G
POWDER TOPICAL 3 TIMES DAILY
Qty: 60 G | Refills: 1 | Status: SHIPPED | OUTPATIENT
Start: 2020-04-08 | End: 2021-04-09

## 2020-04-08 ASSESSMENT — ENCOUNTER SYMPTOMS
COLOR CHANGE: 1
DIFFICULTY URINATING: 0

## 2020-04-08 NOTE — PROGRESS NOTES
"Subjective     Bean Huynh is a 34 year old male who is being evaluated via a billable telephone visit.      The patient has been notified of following:     \"This telephone visit will be conducted via a call between you and your physician/provider. We have found that certain health care needs can be provided without the need for a physical exam.  This service lets us provide the care you need with a short phone conversation.  If a prescription is necessary we can send it directly to your pharmacy.  If lab work is needed we can place an order for that and you can then stop by our lab to have the test done at a later time.    Telephone visits are billed at different rates depending on your insurance coverage. During this emergency period, for some insurers they may be billed the same as an in-person visit.  Please reach out to your insurance provider with any questions.    If during the course of the call the physician/provider feels a telephone visit is not appropriate, you will not be charged for this service.\"    Patient has given verbal consent for Telephone visit?  Yes    Bean Huynh complains of No chief complaint on file.      ALLERGIES  Patient has no known allergies.    Rash      Duration: on going- on and off    Description  Location: in groin area  Itching: mild    Intensity:  moderate    Accompanying signs and symptoms: redness, irritation    History (similar episodes/previous evaluation): None    Precipitating or alleviating factors:  New exposures:  None  Recent travel: YES- will discuss     Therapies tried and outcome: Topical nystatin powder, ointment and ketoconazole have worked for him in the past.        Reviewed and updated as needed this visit by Provider         Review of Systems   Genitourinary: Negative for difficulty urinating and genital sores.   Skin: Positive for color change and rash.             Objective   Reported vitals:  There were no vitals taken for this visit. "     Psych: Alert and  coherent speech    Diagnostic Test Results:  Labs reviewed in Epic        Assessment/Plan:  1. Candida rash of groin  Start nystatin powder.  If no improvement after a week, recommend Mycolog and or Nizoral.  Continue dry, wicking underwear and keep area dry.  - nystatin (MYCOSTATIN) [id number] UNIT/GM external powder; Apply topically 3 times daily  Dispense: 60 g; Refill: 1    Return in about 1 week (around 4/15/2020) for If symptoms do not improve or gets worse..      Phone call duration:  6 minutes    Frederick Acosta MD

## 2021-04-09 DIAGNOSIS — B37.89 CANDIDA RASH OF GROIN: ICD-10-CM

## 2021-04-09 RX ORDER — NYSTATIN 100000 [USP'U]/G
POWDER TOPICAL 3 TIMES DAILY
Qty: 60 G | Refills: 0 | Status: SHIPPED | OUTPATIENT
Start: 2021-04-09 | End: 2021-04-21

## 2021-04-09 NOTE — TELEPHONE ENCOUNTER
Medication is being filled for 1 time refill only due to:  Patient needs to be seen because it has been more than one year since last visit.  Routing to MA/TC pool. The Pt is due for a visit with PCP. Please call and help them schedule.    RN will issue a 90 day supply. No further refills until the Pt is seen.     Edgar RUST RN

## 2021-04-12 ENCOUNTER — TELEPHONE (OUTPATIENT)
Dept: FAMILY MEDICINE | Facility: CLINIC | Age: 36
End: 2021-04-12

## 2021-04-12 NOTE — TELEPHONE ENCOUNTER
Central Prior Authorization Team  Phone: [phone]    PA Initiation    Medication: nystatin (MYCOSTATIN) [id number] UNIT/GM external powder  Insurance Company: Comment:  [employer]  Pharmacy Filling the Rx: [id number] - [place], [place] - [address] AT [facility] [address]  Filling Pharmacy Phone: [phone]  Filling Pharmacy Fax:    Start Date: 4/12/2021

## 2021-04-12 NOTE — TELEPHONE ENCOUNTER
Prior Authorization Retail Medication Request    Medication/Dose: nystatin (MYCOSTATIN) 295296 UNIT/GM external powder  ICD code (if different than what is on RX):    Previously Tried and Failed: nystatin-triamcinolone (MYCOLOG) ointment  Rationale:  Patient has used this medication in the past with good results.     [employer]    KEY: [id number]  PATIENT LAST NAME: JUAN  : 1985      Pharmacy Information (if different than what is on RX)  Name:    Phone:        Bobo ALMANZAR

## 2021-04-14 NOTE — TELEPHONE ENCOUNTER
Prior Authorization Approval    Authorization Effective Date: 4/14/2021  Authorization Expiration Date: 4/14/2022  Medication: nystatin (MYCOSTATIN) [id number] UNIT/GM external powder- APPROVED   Approved Dose/Quantity:  Reference #:     Insurance Company: Comment:  [employer]  Expected CoPay:       CoPay Card Available:      Foundation Assistance Needed:    Which Pharmacy is filling the prescription (Not needed for infusion/clinic administered): [id number] - [place], [place] - [address] AT [facility] [address]  Pharmacy Notified: Yes  Patient Notified:  **Instructed pharmacy to notify patient when script is ready to /ship.**

## 2021-04-16 NOTE — TELEPHONE ENCOUNTER
Left message for patient to call back. Please assist in scheduling follow up appt.    Elena Walls,

## 2021-04-21 ENCOUNTER — VIRTUAL VISIT (OUTPATIENT)
Dept: FAMILY MEDICINE | Facility: CLINIC | Age: 36
End: 2021-04-21
Payer: COMMERCIAL

## 2021-04-21 DIAGNOSIS — B37.89 CANDIDA RASH OF GROIN: ICD-10-CM

## 2021-04-21 DIAGNOSIS — L30.4 INTERTRIGO: Primary | ICD-10-CM

## 2021-04-21 PROCEDURE — 99213 OFFICE O/P EST LOW 20 MIN: CPT | Mod: 95 | Performed by: FAMILY MEDICINE

## 2021-04-21 RX ORDER — NYSTATIN 100000 [USP'U]/G
POWDER TOPICAL 3 TIMES DAILY
Qty: 60 G | Refills: 5 | Status: SHIPPED | OUTPATIENT
Start: 2021-04-21

## 2021-04-21 NOTE — PROGRESS NOTES
Bean is a 35 year old who is being evaluated via a billable video visit.      How would you like to obtain your AVS? MyChart  If the video visit is dropped, the invitation should be resent by: Send to e-mail at: [email or web address]  Will anyone else be joining your video visit? No      Video Start Time: 1:19 PM    Assessment & Plan     Intertrigo  Continue current treatment for rash as this has improved rash in the past.  Follow-up if worsening or not improving.    Candida rash of groin  - nystatin (MYCOSTATIN) [id number] UNIT/GM external powder; Apply topically 3 times daily SABAS REFILL, NEEDS OFFICE VISIT FOR FURTHER REFILLS                 No follow-ups on file.    Piotr Zabala MD  [facility]    León Del Angel is a 35 year old who presents for the following health issues     HPI     Patient with history of groin rash for which he was treated with antifungal successfully in the past.  Has recurrence and would like refill of medication.    Review of Systems         Objective           Vitals:  No vitals were obtained today due to virtual visit.    Physical Exam   GENERAL: Healthy, alert and no distress  EYES: Eyes grossly normal to inspection.  No discharge or erythema, or obvious scleral/conjunctival abnormalities.  RESP: No audible wheeze, cough, or visible cyanosis.  No visible retractions or increased work of breathing.    SKIN: Visible skin clear. No significant rash, abnormal pigmentation or lesions.  NEURO: Cranial nerves grossly intact.  Mentation and speech appropriate for age.  PSYCH: Mentation appears normal, affect normal/bright, judgement and insight intact, normal speech and appearance well-groomed.                Video-Visit Details    Type of service:  Video Visit    Video End Time:1:24 PM    Originating Location (pt. Location): Home    Distant Location (provider location):  [facility]     Platform used for Video Visit: LOOKSIMA

## 2021-06-30 ENCOUNTER — TELEPHONE (OUTPATIENT)
Dept: CONSULT | Facility: CLINIC | Age: 36
End: 2021-06-30

## 2021-06-30 DIAGNOSIS — Z84.81 FAMILY HISTORY OF GENETIC DISEASE CARRIER: Primary | ICD-10-CM

## 2021-06-30 DIAGNOSIS — Z84.81 FAMILY HISTORY OF GENETIC DISEASE CARRIER: ICD-10-CM

## 2021-06-30 PROCEDURE — 36415 COLL VENOUS BLD VENIPUNCTURE: CPT | Performed by: MEDICAL GENETICS

## 2021-08-26 ENCOUNTER — ANCILLARY PROCEDURE (OUTPATIENT)
Dept: GENERAL RADIOLOGY | Facility: CLINIC | Age: 36
End: 2021-08-26
Attending: PHYSICIAN ASSISTANT
Payer: COMMERCIAL

## 2021-08-26 ENCOUNTER — OFFICE VISIT (OUTPATIENT)
Dept: URGENT CARE | Facility: URGENT CARE | Age: 36
End: 2021-08-26
Payer: COMMERCIAL

## 2021-08-26 VITALS
SYSTOLIC BLOOD PRESSURE: 140 MMHG | OXYGEN SATURATION: 98 % | DIASTOLIC BLOOD PRESSURE: 98 MMHG | HEART RATE: 110 BPM | TEMPERATURE: 98.3 F

## 2021-08-26 DIAGNOSIS — M25.532 LEFT WRIST PAIN: Primary | ICD-10-CM

## 2021-08-26 DIAGNOSIS — M25.532 LEFT WRIST PAIN: ICD-10-CM

## 2021-08-26 LAB — URATE SERPL-MCNC: 7.2 MG/DL (ref 3.5–7.2)

## 2021-08-26 PROCEDURE — 84550 ASSAY OF BLOOD/URIC ACID: CPT | Performed by: PHYSICIAN ASSISTANT

## 2021-08-26 PROCEDURE — 73110 X-RAY EXAM OF WRIST: CPT | Mod: LT | Performed by: RADIOLOGY

## 2021-08-26 PROCEDURE — 99214 OFFICE O/P EST MOD 30 MIN: CPT | Performed by: PHYSICIAN ASSISTANT

## 2021-08-26 PROCEDURE — 36415 COLL VENOUS BLD VENIPUNCTURE: CPT | Performed by: PHYSICIAN ASSISTANT

## 2021-08-26 NOTE — PROGRESS NOTES
Assessment/Plan:    Xray L wrist negative for fracture & arthritis per my read. NVI. No erythema, minimal tenderness so low suspicion for gout but will check uric acid. Suspect tendonitis vs possible ganglion cyst. Pt given wrist brace. NSAIDs and RICE also advised. F/u with ortho if symptoms persist, referral placed.  See patient instructions below.    At the end of the encounter, I discussed results, diagnosis, medications. Discussed red flags for immediate return to clinic/ER, as well as indications for follow up if no improvement. Patient understood and agreed to plan. Patient was stable for discharge.      ICD-10-CM    1. Left wrist pain  M25.532 XR Wrist Left G/E 3 Views     Uric acid     Uric acid     Wrist/Arm/Hand Supplies Order for DME - ONLY FOR DME     Orthopedic  Referral         Return in about 2 weeks (around 9/9/2021) for follow up with ortho if not better.    [facility], [facility]  [facility]  -----------------------------------------------------------------------------------------------------------------------------------------------------    HPI:  Bean Huynh is a 35 year old male who presents for evaluation of L wrist pain onset 1 week ago. He notes he went golf for the first time in a while around the time the pain started, but had no specific injury. Pain occurs primarily when moving the wrist. No treatments tried. Patient reports no fever/chills, warmth, erythema, numbness/tingling, rash, or any other symptoms. No hx of gout or arthritis.    Past Medical History:   Diagnosis Date     NO ACTIVE PROBLEMS        Vitals:    08/26/21 1138   BP: (!) 140/98   Pulse: 110   Temp: 98.3  F (36.8  C)   SpO2: 98%       Physical Exam  Vitals and nursing note reviewed.   Cardiovascular:      Pulses:           Radial pulses are 2+ on the left side.   Pulmonary:      Effort: Pulmonary effort is normal.   Musculoskeletal:      Left wrist: Swelling (olecranon  process) and tenderness (minimal, olecranon process) present. No snuff box tenderness. Normal range of motion.   Neurological:      Mental Status: He is alert.         Labs/Imaging:  No results found for this or any previous visit (from the past 24 hour(s)).    Xray L wrist: negative for fracture, arthritis per my read    Patient Instructions   I suspect your issue is due to an overuse injury such as tendonitis, or possibly a benign cyst called a ganglion cyst.   We will let you know if uric acid is elevated, which would indicate gout.    1) Take Ibuprofen 600 mg 3-4 times daily with food as needed for pain. If this is not sufficient for pain control it may be combined with Tylenol up to 1000 mg four times a day.   2) Ice to the affected area 20 minutes three times daily.  3) Elevate the affected injury above the level of the heart when you are able to.   4) Compress the affected area using an ACE wrap or brace. This will help to keep swelling down and helps with pain relief.   5) Follow up with ortho in 10-14 days if not improving, sooner if worsening.

## 2021-08-26 NOTE — PATIENT INSTRUCTIONS
I suspect your issue is due to an overuse injury such as tendonitis, or possibly a benign cyst called a ganglion cyst.   We will let you know if uric acid is elevated, which would indicate gout.    1) Take Ibuprofen 600 mg 3-4 times daily with food as needed for pain. If this is not sufficient for pain control it may be combined with Tylenol up to 1000 mg four times a day.   2) Ice to the affected area 20 minutes three times daily.  3) Elevate the affected injury above the level of the heart when you are able to.   4) Compress the affected area using an ACE wrap or brace. This will help to keep swelling down and helps with pain relief.   5) Follow up with ortho in 10-14 days if not improving, sooner if worsening.

## 2021-09-21 LAB — COPATH REPORT: NORMAL

## 2021-12-01 NOTE — NURSING NOTE
"Reason For Visit:   Chief Complaint   Patient presents with     Consult     Pt. states that he is here today for Left Pelvis Lesion. He mention that he been having ongoing pain for about one month. Referring:  CHERYLE HEWITT       Pain Assessment  Patient Currently in Pain: Yes  0-10 Pain Scale: 4  Primary Pain Location: Pelvis  Pain Orientation: Left  Pain Descriptors: Discomfort  Alleviating Factors: NSAIDS, Rest  Aggravating Factors: Movement, Stretching, Walking, Standing, Lying               HEIGHT: 5' 9.25\", WEIGHT: 215 lbs 4.8 oz, BMI: Body mass index is 31.57 kg/(m^2).      Current Outpatient Prescriptions   Medication Sig Dispense Refill     ibuprofen (ADVIL/MOTRIN) 600 MG tablet Take 600 mg by mouth       HYDROcodone-acetaminophen (NORCO) 5-325 MG per tablet Take 1-2 tablets by mouth          No Known Allergies    " accepted

## 2022-01-26 ENCOUNTER — IMMUNIZATION (OUTPATIENT)
Dept: NURSING | Facility: CLINIC | Age: 37
End: 2022-01-26
Payer: COMMERCIAL

## 2022-01-26 PROCEDURE — 91305 COVID-19,PF,PFIZER (12+ YRS): CPT

## 2022-01-26 PROCEDURE — 0054A COVID-19,PF,PFIZER (12+ YRS): CPT

## 2022-08-18 ENCOUNTER — LAB (OUTPATIENT)
Dept: URGENT CARE | Facility: URGENT CARE | Age: 37
End: 2022-08-18
Payer: COMMERCIAL

## 2022-08-18 DIAGNOSIS — Z20.822 SUSPECTED COVID-19 VIRUS INFECTION: ICD-10-CM

## 2022-08-18 LAB — SARS-COV-2 RNA RESP QL NAA+PROBE: POSITIVE

## 2022-08-18 PROCEDURE — U0005 INFEC AGEN DETEC AMPLI PROBE: HCPCS

## 2022-08-18 PROCEDURE — U0003 INFECTIOUS AGENT DETECTION BY NUCLEIC ACID (DNA OR RNA); SEVERE ACUTE RESPIRATORY SYNDROME CORONAVIRUS 2 (SARS-COV-2) (CORONAVIRUS DISEASE [COVID-19]), AMPLIFIED PROBE TECHNIQUE, MAKING USE OF HIGH THROUGHPUT TECHNOLOGIES AS DESCRIBED BY CMS-2020-01-R: HCPCS

## 2022-10-20 ENCOUNTER — IMMUNIZATION (OUTPATIENT)
Dept: INTERNAL MEDICINE | Facility: CLINIC | Age: 37
End: 2022-10-20
Payer: COMMERCIAL

## 2022-10-20 DIAGNOSIS — Z23 NEED FOR PROPHYLACTIC VACCINATION AND INOCULATION AGAINST INFLUENZA: Primary | ICD-10-CM

## 2022-10-20 PROCEDURE — 99207 PR NO CHARGE NURSE ONLY: CPT

## 2022-10-20 PROCEDURE — 90471 IMMUNIZATION ADMIN: CPT

## 2022-10-20 PROCEDURE — 90686 IIV4 VACC NO PRSV 0.5 ML IM: CPT

## 2023-01-05 NOTE — PROGRESS NOTES
Chief complaint: Lower back pain.    History of present illness: This is a 31-year-old male with a one-month history of lower back pain. She has had lower back pain on and off over the past year but it has acutely worsened over the last month. In April of this year he was evaluated as a donor of a kidney. On a CT scan as part of that evaluation a bony lesion on his left ilium was found incidentally. She went to an emergency room as part of his workup when he first started experiencing low back pain one month ago. He was given Norco and ibuprofen. They encouraged him to develop primary care. He was seen in the line where an MRI of his SI joint was performed. Again, the lesion of his left ilium was seen and thus he was referred to our clinic. Patient has pain over his right mid back and right SI joint that radiates down his right buttocks and down the posterior aspect of his right leg to his knee. It does not radiate past his knee. He describes the pain in his leg is shooting and tingling. He denies numbness. Pain is better with walking and stretches. There is also better with Shannan which he takes occasionally and ibuprofen. He is scheduled to start physical therapy tomorrow. This made worse with working, lifting, bending, sitting, standing. He denies any change to his bladder all habits. He denies fevers. He denies weight changes. At worst the pain is rated as 10 out of 10, but it is usually 5-6 out of 10. He is scheduled to start physical therapy tomorrow.    Past medical history: Denies    Past surgical history: Denies    Allergies: Denies    Social history: Denies smoking, she has a few alcoholic beverages per week, no other drug use    ROS: A complete ROS was performed and attached at the end of the note.    Physical Exam: This is a pleasant 31 year old male in NAD. He is appropriately conversant. His breathing is non-labored on room air. He has 5/5 strength bilaterally on hip flexion, hip abduction, hip  adduction, knee flexion, knee extension, EHL, FHL, TA, GSC. SILT L3-S1. Negative straight leg raise bilaterally. He has tenderness over his R SI joint. Bilateral PRICE tests are positive in reproducing his R SI joint pain.    Imaging: CT scan from 4/27/17 and MRI from 7/13/17 were reviewed. They reveal an expansile lesion of the left ilium. It is well corticated containing sclerotic foci. Within the lesion there is lipomatous material. This is a latent-appearing lesion.    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 His back pain and his L bone mass are unrelated.    -Agree with PT referral for R SI joint pain  -L iliac bone mass is not a concern. No need for monitoring or activity restriction  - No contraindications to being a kidney donor  Answers for HPI/ROS submitted by the patient on 7/19/2017   General Symptoms: No  Skin Symptoms: No  HENT Symptoms: No  EYE SYMPTOMS: No  HEART SYMPTOMS: No  LUNG SYMPTOMS: No  INTESTINAL SYMPTOMS: No  URINARY SYMPTOMS: No  REPRODUCTIVE SYMPTOMS: No  SKELETAL SYMPTOMS: No  BLOOD SYMPTOMS: No  NERVOUS SYSTEM SYMPTOMS: No  MENTAL HEALTH SYMPTOMS: No    Elysia Juárez MD  PGY-2  Orthopaedic Surgery   [phone]       Nsaids Pregnancy And Lactation Text: These medications are considered safe up to 30 weeks gestation. It is excreted in breast milk.

## 2023-09-06 ENCOUNTER — IMMUNIZATION (OUTPATIENT)
Dept: NURSING | Facility: CLINIC | Age: 38
End: 2023-09-06
Payer: COMMERCIAL

## 2023-09-06 PROCEDURE — 90471 IMMUNIZATION ADMIN: CPT

## 2023-09-06 PROCEDURE — 90686 IIV4 VACC NO PRSV 0.5 ML IM: CPT

## 2023-10-01 ENCOUNTER — HEALTH MAINTENANCE LETTER (OUTPATIENT)
Age: 38
End: 2023-10-01

## 2024-11-13 ENCOUNTER — VIRTUAL VISIT (OUTPATIENT)
Dept: INTERNAL MEDICINE | Facility: CLINIC | Age: 39
End: 2024-11-13
Payer: COMMERCIAL

## 2024-11-13 DIAGNOSIS — L98.9 SKIN LESION: Primary | ICD-10-CM

## 2024-11-13 PROCEDURE — 99203 OFFICE O/P NEW LOW 30 MIN: CPT | Mod: 95 | Performed by: INTERNAL MEDICINE

## 2024-11-13 NOTE — PROGRESS NOTES
"Bean is a 38 year old who is being evaluated via a billable video visit.    How would you like to obtain your AVS? MyChart  If the video visit is dropped, the invitation should be resent by: Text to cell phone: [phone]  Will anyone else be joining your video visit? No    Assessment & Plan   Skin lesion  Recommended formal dermatology consult. Referral placed.  - Adult Dermatology  Referral; Future    F/u with regular PCP at regular interval or sooner PRN    Signed Electronically by:  Sanjay Del Castillo MD, MPH  [facility]  Internal Medicine    Subjective   Bean is a 38 year old who presents for a same day acute care virtual video visit with chief concern of: Derm Problem  This is the first time I have met Bean.    History of Present Illness       Reason for visit:  Spot on cheek  Symptoms include:  Appeared in May, started turning purple a few weeks, Maybe pimple or cyst of some kind.      Per MyChart appointment request note: \"I have a growth on my cheek, it could be a cyst or infected pimple. I will likely need it professionally removed, and could use a referral to the correct clinic.\"        Objective       Vitals: No vitals were obtained today due to virtual visit.    Physical Exam   GENERAL: alert and no distress  EYES: Eyes grossly normal to inspection.  No discharge or erythema, or obvious scleral/conjunctival abnormalities.  RESP: No audible wheeze, cough, or visible cyanosis.    SKIN: Subcutaneous mass seen on R cheek. Visible skin otherwise clear.  NEURO: Cranial nerves grossly intact.  Mentation and speech appropriate for age.  PSYCH: Appropriate affect, tone, and pace of words    Video-Visit Details  Type of service: Video Visit   Originating Location (pt. Location): Home  Distant Location (provider location):  Off-site  Platform used for Video Visit: Jean Pierre"

## 2024-11-19 ENCOUNTER — IMMUNIZATION (OUTPATIENT)
Dept: INTERNAL MEDICINE | Facility: CLINIC | Age: 39
End: 2024-11-19
Payer: COMMERCIAL

## 2024-11-19 PROCEDURE — 90471 IMMUNIZATION ADMIN: CPT

## 2024-11-19 PROCEDURE — 90656 IIV3 VACC NO PRSV 0.5 ML IM: CPT

## 2024-11-24 ENCOUNTER — HEALTH MAINTENANCE LETTER (OUTPATIENT)
Age: 39
End: 2024-11-24

## 2024-12-06 ENCOUNTER — HOSPITAL ENCOUNTER (INPATIENT)
Facility: CLINIC | Age: 39
LOS: 4 days | Discharge: HOME | End: 2024-12-10
Attending: EMERGENCY MEDICINE | Admitting: INTERNAL MEDICINE
Payer: COMMERCIAL

## 2024-12-06 DIAGNOSIS — R00.2 PALPITATIONS: ICD-10-CM

## 2024-12-06 DIAGNOSIS — F10.930 ALCOHOL WITHDRAWAL, UNCOMPLICATED (H): ICD-10-CM

## 2024-12-06 DIAGNOSIS — F41.9 ANXIETY: ICD-10-CM

## 2024-12-06 DIAGNOSIS — R94.31 ABNORMAL ELECTROCARDIOGRAM: ICD-10-CM

## 2024-12-06 DIAGNOSIS — I10 HYPERTENSION, UNSPECIFIED TYPE: ICD-10-CM

## 2024-12-06 DIAGNOSIS — R25.1 TREMOR: Primary | ICD-10-CM

## 2024-12-06 LAB
ALBUMIN SERPL BCG-MCNC: 5.2 G/DL (ref 3.5–5.2)
ALBUMIN UR-MCNC: 50 MG/DL
ALCOHOL BREATH TEST: 0 (ref 0–0.01)
ALP SERPL-CCNC: 76 U/L (ref 40–150)
ALT SERPL W P-5'-P-CCNC: 108 U/L (ref 0–70)
AMPHETAMINES UR QL SCN: NORMAL
ANION GAP SERPL CALCULATED.3IONS-SCNC: 19 MMOL/L (ref 7–15)
APPEARANCE UR: CLEAR
AST SERPL W P-5'-P-CCNC: 110 U/L (ref 0–45)
ATRIAL RATE - MUSE: 115 BPM
BARBITURATES UR QL SCN: NORMAL
BASOPHILS # BLD AUTO: 0 10E3/UL (ref 0–0.2)
BASOPHILS NFR BLD AUTO: 0 %
BENZODIAZ UR QL SCN: NORMAL
BILIRUB SERPL-MCNC: 1.5 MG/DL
BILIRUB UR QL STRIP: NEGATIVE
BUN SERPL-MCNC: 11.1 MG/DL (ref 6–20)
BZE UR QL SCN: NORMAL
CALCIUM SERPL-MCNC: 10 MG/DL (ref 8.8–10.4)
CANNABINOIDS UR QL SCN: NORMAL
CHLORIDE SERPL-SCNC: 92 MMOL/L (ref 98–107)
COLOR UR AUTO: YELLOW
CREAT SERPL-MCNC: 0.86 MG/DL (ref 0.67–1.17)
DIASTOLIC BLOOD PRESSURE - MUSE: NORMAL MMHG
EGFRCR SERPLBLD CKD-EPI 2021: >90 ML/MIN/1.73M2
EOSINOPHIL # BLD AUTO: 0 10E3/UL (ref 0–0.7)
EOSINOPHIL NFR BLD AUTO: 0 %
ERYTHROCYTE [DISTWIDTH] IN BLOOD BY AUTOMATED COUNT: 11.8 % (ref 10–15)
FENTANYL UR QL: NORMAL
GLUCOSE SERPL-MCNC: 189 MG/DL (ref 70–99)
GLUCOSE UR STRIP-MCNC: >=1000 MG/DL
HCO3 SERPL-SCNC: 23 MMOL/L (ref 22–29)
HCT VFR BLD AUTO: 46 % (ref 40–53)
HGB BLD-MCNC: 16.5 G/DL (ref 13.3–17.7)
HGB UR QL STRIP: ABNORMAL
IMM GRANULOCYTES # BLD: 0 10E3/UL
IMM GRANULOCYTES NFR BLD: 0 %
INTERPRETATION ECG - MUSE: NORMAL
KETONES UR STRIP-MCNC: 100 MG/DL
LEUKOCYTE ESTERASE UR QL STRIP: NEGATIVE
LYMPHOCYTES # BLD AUTO: 0.4 10E3/UL (ref 0.8–5.3)
LYMPHOCYTES NFR BLD AUTO: 4 %
MAGNESIUM SERPL-MCNC: 1.8 MG/DL (ref 1.7–2.3)
MCH RBC QN AUTO: 32.7 PG (ref 26.5–33)
MCHC RBC AUTO-ENTMCNC: 35.9 G/DL (ref 31.5–36.5)
MCV RBC AUTO: 91 FL (ref 78–100)
MONOCYTES # BLD AUTO: 0.5 10E3/UL (ref 0–1.3)
MONOCYTES NFR BLD AUTO: 5 %
MUCOUS THREADS #/AREA URNS LPF: PRESENT /LPF
NEUTROPHILS # BLD AUTO: 9.4 10E3/UL (ref 1.6–8.3)
NEUTROPHILS NFR BLD AUTO: 91 %
NITRATE UR QL: NEGATIVE
NRBC # BLD AUTO: 0 10E3/UL
NRBC BLD AUTO-RTO: 0 /100
OPIATES UR QL SCN: NORMAL
P AXIS - MUSE: 15 DEGREES
PCP QUAL URINE (ROCHE): NORMAL
PH UR STRIP: 5.5 [PH] (ref 5–7)
PLATELET # BLD AUTO: 165 10E3/UL (ref 150–450)
POTASSIUM SERPL-SCNC: 3.8 MMOL/L (ref 3.4–5.3)
PR INTERVAL - MUSE: 138 MS
PROT SERPL-MCNC: 8.4 G/DL (ref 6.4–8.3)
QRS DURATION - MUSE: 122 MS
QT - MUSE: 360 MS
QTC - MUSE: 498 MS
R AXIS - MUSE: -21 DEGREES
RBC # BLD AUTO: 5.04 10E6/UL (ref 4.4–5.9)
RBC URINE: 2 /HPF
SODIUM SERPL-SCNC: 134 MMOL/L (ref 135–145)
SP GR UR STRIP: 1.03 (ref 1–1.03)
SYSTOLIC BLOOD PRESSURE - MUSE: NORMAL MMHG
T AXIS - MUSE: 137 DEGREES
TROPONIN T SERPL HS-MCNC: 19 NG/L
TSH SERPL DL<=0.005 MIU/L-ACNC: 0.64 UIU/ML (ref 0.3–4.2)
UROBILINOGEN UR STRIP-MCNC: NORMAL MG/DL
VENTRICULAR RATE- MUSE: 115 BPM
WBC # BLD AUTO: 10.4 10E3/UL (ref 4–11)
WBC URINE: 1 /HPF

## 2024-12-06 PROCEDURE — 99207 PR NO CHARGE LOS: CPT | Performed by: NURSE PRACTITIONER

## 2024-12-06 PROCEDURE — 81001 URINALYSIS AUTO W/SCOPE: CPT | Performed by: PHYSICIAN ASSISTANT

## 2024-12-06 PROCEDURE — HZ2ZZZZ DETOXIFICATION SERVICES FOR SUBSTANCE ABUSE TREATMENT: ICD-10-PCS | Performed by: INTERNAL MEDICINE

## 2024-12-06 PROCEDURE — 82075 ASSAY OF BREATH ETHANOL: CPT | Performed by: EMERGENCY MEDICINE

## 2024-12-06 PROCEDURE — 82040 ASSAY OF SERUM ALBUMIN: CPT | Performed by: PHYSICIAN ASSISTANT

## 2024-12-06 PROCEDURE — 85025 COMPLETE CBC W/AUTO DIFF WBC: CPT | Performed by: PHYSICIAN ASSISTANT

## 2024-12-06 PROCEDURE — 99285 EMERGENCY DEPT VISIT HI MDM: CPT | Mod: FS | Performed by: EMERGENCY MEDICINE

## 2024-12-06 PROCEDURE — 84484 ASSAY OF TROPONIN QUANT: CPT | Performed by: PHYSICIAN ASSISTANT

## 2024-12-06 PROCEDURE — 80307 DRUG TEST PRSMV CHEM ANLYZR: CPT | Performed by: PHYSICIAN ASSISTANT

## 2024-12-06 PROCEDURE — 93010 ELECTROCARDIOGRAM REPORT: CPT | Performed by: EMERGENCY MEDICINE

## 2024-12-06 PROCEDURE — 99222 1ST HOSP IP/OBS MODERATE 55: CPT | Performed by: INTERNAL MEDICINE

## 2024-12-06 PROCEDURE — 96360 HYDRATION IV INFUSION INIT: CPT | Performed by: EMERGENCY MEDICINE

## 2024-12-06 PROCEDURE — 120N000002 HC R&B MED SURG/OB UMMC

## 2024-12-06 PROCEDURE — 84443 ASSAY THYROID STIM HORMONE: CPT | Performed by: PHYSICIAN ASSISTANT

## 2024-12-06 PROCEDURE — 258N000003 HC RX IP 258 OP 636: Performed by: EMERGENCY MEDICINE

## 2024-12-06 PROCEDURE — 250N000013 HC RX MED GY IP 250 OP 250 PS 637: Performed by: PHYSICIAN ASSISTANT

## 2024-12-06 PROCEDURE — 36415 COLL VENOUS BLD VENIPUNCTURE: CPT | Performed by: PHYSICIAN ASSISTANT

## 2024-12-06 PROCEDURE — 85041 AUTOMATED RBC COUNT: CPT | Performed by: PHYSICIAN ASSISTANT

## 2024-12-06 PROCEDURE — 93005 ELECTROCARDIOGRAM TRACING: CPT | Performed by: EMERGENCY MEDICINE

## 2024-12-06 PROCEDURE — 80053 COMPREHEN METABOLIC PANEL: CPT | Performed by: PHYSICIAN ASSISTANT

## 2024-12-06 PROCEDURE — 99285 EMERGENCY DEPT VISIT HI MDM: CPT | Mod: 25 | Performed by: EMERGENCY MEDICINE

## 2024-12-06 PROCEDURE — 258N000003 HC RX IP 258 OP 636: Performed by: PHYSICIAN ASSISTANT

## 2024-12-06 PROCEDURE — 83735 ASSAY OF MAGNESIUM: CPT | Performed by: PHYSICIAN ASSISTANT

## 2024-12-06 RX ORDER — LOPERAMIDE HYDROCHLORIDE 2 MG/1
2 CAPSULE ORAL 4 TIMES DAILY PRN
OUTPATIENT
Start: 2024-12-06

## 2024-12-06 RX ORDER — FOLIC ACID 1 MG/1
1 TABLET ORAL DAILY
Status: DISCONTINUED | OUTPATIENT
Start: 2024-12-06 | End: 2024-12-10 | Stop reason: HOSPADM

## 2024-12-06 RX ORDER — DIAZEPAM 10 MG/1
10 TABLET ORAL ONCE
Status: COMPLETED | OUTPATIENT
Start: 2024-12-06 | End: 2024-12-06

## 2024-12-06 RX ORDER — ATENOLOL 50 MG/1
50 TABLET ORAL DAILY PRN
OUTPATIENT
Start: 2024-12-06

## 2024-12-06 RX ORDER — ACETAMINOPHEN 325 MG/1
650 TABLET ORAL EVERY 4 HOURS PRN
OUTPATIENT
Start: 2024-12-06

## 2024-12-06 RX ORDER — AMOXICILLIN 250 MG
1 CAPSULE ORAL 2 TIMES DAILY PRN
OUTPATIENT
Start: 2024-12-06

## 2024-12-06 RX ORDER — ACETAMINOPHEN 325 MG/1
325-650 TABLET ORAL EVERY 6 HOURS PRN
Status: ON HOLD | COMMUNITY
End: 2024-12-10

## 2024-12-06 RX ORDER — ONDANSETRON 4 MG/1
4 TABLET, FILM COATED ORAL EVERY 8 HOURS PRN
Status: ON HOLD | COMMUNITY
End: 2024-12-10

## 2024-12-06 RX ORDER — ONDANSETRON 4 MG/1
4 TABLET, ORALLY DISINTEGRATING ORAL EVERY 6 HOURS PRN
OUTPATIENT
Start: 2024-12-06

## 2024-12-06 RX ORDER — TRAZODONE HYDROCHLORIDE 50 MG/1
50 TABLET, FILM COATED ORAL
OUTPATIENT
Start: 2024-12-06

## 2024-12-06 RX ORDER — DIAZEPAM 5 MG/1
5-20 TABLET ORAL EVERY 30 MIN PRN
Status: DISCONTINUED | OUTPATIENT
Start: 2024-12-06 | End: 2024-12-07

## 2024-12-06 RX ORDER — HYDROXYZINE HYDROCHLORIDE 25 MG/1
25 TABLET, FILM COATED ORAL EVERY 4 HOURS PRN
OUTPATIENT
Start: 2024-12-06

## 2024-12-06 RX ORDER — MULTIPLE VITAMINS W/ MINERALS TAB 9MG-400MCG
1 TAB ORAL DAILY
Status: DISCONTINUED | OUTPATIENT
Start: 2024-12-06 | End: 2024-12-10 | Stop reason: HOSPADM

## 2024-12-06 RX ORDER — MAGNESIUM HYDROXIDE/ALUMINUM HYDROXICE/SIMETHICONE 120; 1200; 1200 MG/30ML; MG/30ML; MG/30ML
30 SUSPENSION ORAL EVERY 4 HOURS PRN
OUTPATIENT
Start: 2024-12-06

## 2024-12-06 RX ADMIN — DIAZEPAM 10 MG: 5 TABLET ORAL at 19:42

## 2024-12-06 RX ADMIN — SODIUM CHLORIDE 1000 ML: 9 INJECTION, SOLUTION INTRAVENOUS at 21:13

## 2024-12-06 RX ADMIN — DIAZEPAM 10 MG: 5 TABLET ORAL at 17:05

## 2024-12-06 RX ADMIN — FOLIC ACID 1 MG: 1 TABLET ORAL at 15:39

## 2024-12-06 RX ADMIN — DIAZEPAM 10 MG: 10 TABLET ORAL at 15:39

## 2024-12-06 RX ADMIN — SODIUM CHLORIDE 1000 ML: 9 INJECTION, SOLUTION INTRAVENOUS at 15:40

## 2024-12-06 RX ADMIN — THIAMINE HCL TAB 100 MG 100 MG: 100 TAB at 15:40

## 2024-12-06 RX ADMIN — Medication 1 TABLET: at 15:39

## 2024-12-06 RX ADMIN — DIAZEPAM 10 MG: 5 TABLET ORAL at 21:22

## 2024-12-06 ASSESSMENT — ACTIVITIES OF DAILY LIVING (ADL)
ADLS_ACUITY_SCORE: 41

## 2024-12-06 ASSESSMENT — COLUMBIA-SUICIDE SEVERITY RATING SCALE - C-SSRS
2. HAVE YOU ACTUALLY HAD ANY THOUGHTS OF KILLING YOURSELF IN THE PAST MONTH?: NO
6. HAVE YOU EVER DONE ANYTHING, STARTED TO DO ANYTHING, OR PREPARED TO DO ANYTHING TO END YOUR LIFE?: NO
1. IN THE PAST MONTH, HAVE YOU WISHED YOU WERE DEAD OR WISHED YOU COULD GO TO SLEEP AND NOT WAKE UP?: NO

## 2024-12-06 NOTE — ED PROVIDER NOTES
ED Provider Note  [facility]      History     Chief Complaint   Patient presents with    Alcohol Intoxication     HPI  Bean Huynh is a 39 year old male who presents emergency department requesting alcohol detox.  He presents with his wife.    He notes over the last 2 to 3 weeks he has been drinking half a liter of vodka a day last drink was 9 PM.  He states that he currently feels like he is withdrawing with associated tremor, anxiety, high blood pressure and palpitations.  He notes no SI or HI.  Denies any other drug use.  No recent falls.  Denies any history of withdrawal seizures or DTs.  He has never been to detox before and is interested in detox.  He denies any associated fevers runny nose cough dyspnea chest pain abdominal pain.  He denies any dizziness or lightheadedness no history of heart problems in the past.  No recent vomiting.  No recent falls.  Denies other chronic medical problems.  He states he has been eating small amounts of food at home although has lost about 15 pounds in the last 2 months due to his alcohol use.    He also notes he has had some discoloration of his urine as well recently without dysuria or hematuria.  He wants a UA.    Past Medical History  Past Medical History:   Diagnosis Date    NO ACTIVE PROBLEMS      Past Surgical History:   Procedure Laterality Date    s/p wisdom teeth extraction       acetaminophen (TYLENOL) 325 MG tablet  ondansetron (ZOFRAN) 4 MG tablet      No Known Allergies  Family History  Family History   Problem Relation Age of Onset    Hypertension Father     Heart Disease Maternal Grandmother     Diabetes Paternal Grandfather     Heart Disease Paternal Grandfather     Diabetes Paternal Aunt     Colon Cancer Maternal Uncle      Social History   Social History     Tobacco Use    Smoking status: Never    Smokeless tobacco: Never   Vaping Use    Vaping status: Never Used   Substance Use Topics    Alcohol use: Yes      Alcohol/week: 5.0 standard drinks of alcohol     Types: 5 Standard drinks or equivalent per week     Comment: social    Drug use: No      A medically appropriate review of systems was performed with pertinent positives and negatives noted in the HPI, and all other systems negative.    Physical Exam   BP: (!) 194/119  Pulse: 106  Temp: 99  F (37.2  C)  Resp: 18  Weight: 86.2 kg (190 lb)  SpO2: 98 %  Physical Exam      GENERAL APPEARANCE: The patient is well developed, well appearing, and in no acute distress.  Patient appears anxious.  HEAD:  Normocephalic and atraumatic.   EENT: Voice normal.  Tongue fasciculations present.  Uvula midline without exudates.  NECK: Trachea is midline.No lymphadenopathy or tenderness.  LUNGS: Breath sounds are equal and clear bilaterally. No wheezes, rhonchi, or rales.  HEART: Tachycardic tremor present in the upper extremities bilaterally, tongue fasciculations present.  Rate and normal rhythm.  Radial pulses 2+ bilaterally.  ABDOMEN: Soft, flat, and benign. No mass, tenderness, guarding, or rebound.  EXTREMITIES: No cyanosis, clubbing, or edema.  NEUROLOGIC: No focal sensory or motor deficits are noted.  PSYCHIATRIC: The patient is awake, alert.  Appropriate mood and affect.  SKIN: Warm, dry, and well perfused. Good turgor.      ED Course, Procedures, & Data     ED Course as of 12/06/24 1830   Fri Dec 06, 2024   1425 Detox bed reserved     EKG 12/6/2024:    Sinus tachycardia, ventricular rate 115 QTc 498.  On my interpretation the rhythm is regular.  There is a wide-complex present with visible delta wave.  There is also T wave depression in the lateral leads new from prior.  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     Results for orders placed or performed during the hospital encounter of 12/06/24   Comprehensive metabolic panel     Status: Abnormal   Result Value Ref Range    Sodium 134 (L) 135 - 145 mmol/L    Potassium 3.8 3.4 - 5.3 mmol/L    Carbon Dioxide (CO2) 23 22 - 29 mmol/L    Anion Gap 19  (H) 7 - 15 mmol/L    Urea Nitrogen 11.1 6.0 - 20.0 mg/dL    Creatinine 0.86 0.67 - 1.17 mg/dL    GFR Estimate >90 >60 mL/min/1.73m2    Calcium 10.0 8.8 - 10.4 mg/dL    Chloride 92 (L) 98 - 107 mmol/L    Glucose 189 (H) 70 - 99 mg/dL    Alkaline Phosphatase 76 40 - 150 U/L     (H) 0 - 45 U/L     (H) 0 - 70 U/L    Protein Total 8.4 (H) 6.4 - 8.3 g/dL    Albumin 5.2 3.5 - 5.2 g/dL    Bilirubin Total 1.5 (H) <=1.2 mg/dL   Magnesium     Status: Normal   Result Value Ref Range    Magnesium 1.8 1.7 - 2.3 mg/dL   UA with Microscopic reflex to Culture     Status: Abnormal    Specimen: Urine, Midstream   Result Value Ref Range    Color Urine Yellow Colorless, Straw, Light Yellow, Yellow    Appearance Urine Clear Clear    Glucose Urine >=1000 (A) Negative mg/dL    Bilirubin Urine Negative Negative    Ketones Urine 100 (A) Negative mg/dL    Specific Gravity Urine 1.026 1.003 - 1.035    Blood Urine Trace (A) Negative    pH Urine 5.5 5.0 - 7.0    Protein Albumin Urine 50 (A) Negative mg/dL    Urobilinogen Urine Normal Normal, 2.0 mg/dL    Nitrite Urine Negative Negative    Leukocyte Esterase Urine Negative Negative    Mucus Urine Present (A) None Seen /LPF    RBC Urine 2 <=2 /HPF    WBC Urine 1 <=5 /HPF    Narrative    Urine Culture not indicated   TSH with free T4 reflex     Status: Normal   Result Value Ref Range    TSH 0.64 0.30 - 4.20 uIU/mL   Troponin T, High Sensitivity     Status: Normal   Result Value Ref Range    Troponin T, High Sensitivity 19 <=22 ng/L   Urine Drug Screen Panel     Status: Normal   Result Value Ref Range    Amphetamines Urine Screen Negative Screen Negative    Barbituates Urine Screen Negative Screen Negative    Benzodiazepine Urine Screen Negative Screen Negative    Cannabinoids Urine Screen Negative Screen Negative    Cocaine Urine Screen Negative Screen Negative    Fentanyl Qual Urine Screen Negative Screen Negative    Opiates Urine Screen Negative Screen Negative    PCP Urine Screen  Negative Screen Negative   CBC with platelets and differential     Status: Abnormal   Result Value Ref Range    WBC Count 10.4 4.0 - 11.0 10e3/uL    RBC Count 5.04 4.40 - 5.90 10e6/uL    Hemoglobin 16.5 13.3 - 17.7 g/dL    Hematocrit 46.0 40.0 - 53.0 %    MCV 91 78 - 100 fL    MCH 32.7 26.5 - 33.0 pg    MCHC 35.9 31.5 - 36.5 g/dL    RDW 11.8 10.0 - 15.0 %    Platelet Count 165 150 - 450 10e3/uL    % Neutrophils 91 %    % Lymphocytes 4 %    % Monocytes 5 %    % Eosinophils 0 %    % Basophils 0 %    % Immature Granulocytes 0 %    NRBCs per 100 WBC 0 <1 /100    Absolute Neutrophils 9.4 (H) 1.6 - 8.3 10e3/uL    Absolute Lymphocytes 0.4 (L) 0.8 - 5.3 10e3/uL    Absolute Monocytes 0.5 0.0 - 1.3 10e3/uL    Absolute Eosinophils 0.0 0.0 - 0.7 10e3/uL    Absolute Basophils 0.0 0.0 - 0.2 10e3/uL    Absolute Immature Granulocytes 0.0 <=0.4 10e3/uL    Absolute NRBCs 0.0 10e3/uL   EKG 12-lead, tracing only     Status: None   Result Value Ref Range    Systolic Blood Pressure  mmHg    Diastolic Blood Pressure  mmHg    Ventricular Rate 115 BPM    Atrial Rate 115 BPM    NH Interval 138 ms    QRS Duration 122 ms     ms    QTc 498 ms    P Axis 15 degrees    R AXIS -21 degrees    T Axis 137 degrees    Interpretation ECG       Sinus tachycardia  Reginaldo-Parkinson-White  Abnormal ECG  Unconfirmed report - interpretation of this ECG is computer generated - see medical record for final interpretation  Confirmed by - EMERGENCY ROOM, PHYSICIAN ([id number]),  KENZIE GARDINER ([id number]) on 12/6/2024 3:00:58 PM     Alcohol breath test POCT     Status: Normal   Result Value Ref Range    Alcohol Breath Test 0.000 0.00 - 0.01   CBC with platelets differential     Status: Abnormal    Narrative    The following orders were created for panel order CBC with platelets differential.  Procedure                               Abnormality         Status                     ---------                               -----------         ------                      CBC with platelets and d...[[id number]]  Abnormal            Final result                 Please view results for these tests on the individual orders.   Urine Drug Screen     Status: Normal    Narrative    The following orders were created for panel order Urine Drug Screen.  Procedure                               Abnormality         Status                     ---------                               -----------         ------                     Urine Drug Screen Panel[[id number]]      Normal              Final result                 Please view results for these tests on the individual orders.     Medications   diazepam (VALIUM) tablet 5-20 mg (10 mg Oral $Given 12/6/24 1705)   thiamine (B-1) tablet 100 mg (100 mg Oral $Given 12/6/24 1540)   folic acid (FOLVITE) tablet 1 mg (1 mg Oral $Given 12/6/24 1539)   multivitamin w/minerals (THERA-VIT-M) tablet 1 tablet (1 tablet Oral $Given 12/6/24 1539)   diazepam (VALIUM) tablet 10 mg (10 mg Oral $Given 12/6/24 1539)   sodium chloride 0.9% BOLUS 1,000 mL (0 mLs Intravenous Stopped 12/6/24 1656)     Labs Ordered and Resulted from Time of ED Arrival to Time of ED Departure   COMPREHENSIVE METABOLIC PANEL - Abnormal       Result Value    Sodium 134 (*)     Potassium 3.8      Carbon Dioxide (CO2) 23      Anion Gap 19 (*)     Urea Nitrogen 11.1      Creatinine 0.86      GFR Estimate >90      Calcium 10.0      Chloride 92 (*)     Glucose 189 (*)     Alkaline Phosphatase 76       (*)      (*)     Protein Total 8.4 (*)     Albumin 5.2      Bilirubin Total 1.5 (*)    ROUTINE UA WITH MICROSCOPIC REFLEX TO CULTURE - Abnormal    Color Urine Yellow      Appearance Urine Clear      Glucose Urine >=1000 (*)     Bilirubin Urine Negative      Ketones Urine 100 (*)     Specific Gravity Urine 1.026      Blood Urine Trace (*)     pH Urine 5.5      Protein Albumin Urine 50 (*)     Urobilinogen Urine Normal      Nitrite Urine Negative      Leukocyte Esterase Urine Negative       Mucus Urine Present (*)     RBC Urine 2      WBC Urine 1     CBC WITH PLATELETS AND DIFFERENTIAL - Abnormal    WBC Count 10.4      RBC Count 5.04      Hemoglobin 16.5      Hematocrit 46.0      MCV 91      MCH 32.7      MCHC 35.9      RDW 11.8      Platelet Count 165      % Neutrophils 91      % Lymphocytes 4      % Monocytes 5      % Eosinophils 0      % Basophils 0      % Immature Granulocytes 0      NRBCs per 100 WBC 0      Absolute Neutrophils 9.4 (*)     Absolute Lymphocytes 0.4 (*)     Absolute Monocytes 0.5      Absolute Eosinophils 0.0      Absolute Basophils 0.0      Absolute Immature Granulocytes 0.0      Absolute NRBCs 0.0     MAGNESIUM - Normal    Magnesium 1.8     TSH WITH FREE T4 REFLEX - Normal    TSH 0.64     TROPONIN T, HIGH SENSITIVITY - Normal    Troponin T, High Sensitivity 19     URINE DRUG SCREEN PANEL - Normal    Amphetamines Urine Screen Negative      Barbituates Urine Screen Negative      Benzodiazepine Urine Screen Negative      Cannabinoids Urine Screen Negative      Cocaine Urine Screen Negative      Fentanyl Qual Urine Screen Negative      Opiates Urine Screen Negative      PCP Urine Screen Negative     ALCOHOL BREATH TEST POCT - Normal    Alcohol Breath Test 0.000       No orders to display          Critical care was not performed.     Medical Decision Making  The patient's presentation was of high complexity (a chronic illness severe exacerbation, progression, or side effect of treatment).    The patient's evaluation involved:  review of 1 test result(s) ordered prior to this encounter (see separate area of note for details)  ordering and/or review of 3+ test(s) in this encounter (see separate area of note for details)  discussion of management or test interpretation with another health professional (cardiology, mental health intake)    The patient's management necessitated moderate risk (prescription drug management including medications given in the ED), high risk (a parenteral  controlled substance), and high risk (a decision regarding hospitalization).    Assessment & Plan    This is a 39-year-old male present with concerns for alcohol detox.  On presentation to the department he is hypertensive 194/119 and tachycardic to 106.  Is otherwise normoxic.  On exam he has visible resting tremors tongue fasciculations and appears anxious consistent with acute alcohol withdrawal.  Remainder physical exam unremarkable.  I discussed with patient initiating workup including routine EtOH labs, initial 10 oral Valium.  Will also initiate EKG with the tachycardia and patient reporting palpitations.  He is agreeable.  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  Case discussed with cardiology who recommends checking electrolytes and if no other emergent etiology noted he can follow-up outpatient with EP.  He does not have prior EKG showing these findings per chart review.    Remainder of lab work shows EtOH level 0.  UDS negative.  CBC without leukocytosis or anemia.  UA shows glucose urea, ketonuria, trace hematuria without findings for infection.  LFTs elevated   consistent with his heavy alcohol use.  With the concerns for WPW on EKG troponin was initiated and is normal suggesting against ACS.  TSH also initiated and is normal.  Patient given fluids Valium and vitamins while in the ED.  At this point he appears medically clear as he is he reports improvement of symptoms with the Valium.  He will admitted to the inpatient psychiatric unit.  I did discuss with the mental health  recommendations of EP follow-up on discharge and this was discussed with the patient and his family as well.    --    ED Attending Physician Attestation    I SOUTH BRANTLEY MD, cared for this patient with the Advanced Practice Provider (JOEY). I personally provided a substantive portion of the care for this patient, including approving the care plan for the  number and complexity of problems addressed and taking responsibility related to the risk of complications and/or morbidity or mortality of patient management. Please see the JOEY's documentation for full details.    Summary of HPI, PE, ED Course   Patient is a 39 year old male evaluated in the emergency department for acute alcohol intoxication. Exam and ED course notable for tachycardia and HTN. He was placed on the MSSA protocol. After the completion of care in the emergency department, the patient was admitted to inpatient.      SOUTH GORMAN MD  Emergency Medicine       Patient seen and discussed with attending physician , who agrees with my plan of care.    I have reviewed the nursing notes. I have reviewed the findings, diagnosis, plan and need for follow up with the patient.    New Prescriptions    No medications on file       Final diagnoses:   Alcohol withdrawal, uncomplicated (H)   Abnormal electrocardiogram - Concern for HANH Bernardo  [facility] EMERGENCY DEPARTMENT  12/6/2024     Jennifer Vazquez PA-C  12/06/24 1830       South Gorman MD  12/07/24 0040

## 2024-12-06 NOTE — ED TRIAGE NOTES
Pt is here for alcohol detox has been drinking approximately half a bottle of vodka everyday. Last drink was 8 pm last night. No hx of withdrawal seizures.

## 2024-12-07 ENCOUNTER — TELEPHONE (OUTPATIENT)
Dept: BEHAVIORAL HEALTH | Facility: CLINIC | Age: 39
End: 2024-12-07
Payer: COMMERCIAL

## 2024-12-07 LAB
ALBUMIN SERPL BCG-MCNC: 4.4 G/DL (ref 3.5–5.2)
ALP SERPL-CCNC: 64 U/L (ref 40–150)
ALT SERPL W P-5'-P-CCNC: 71 U/L (ref 0–70)
ANION GAP SERPL CALCULATED.3IONS-SCNC: 13 MMOL/L (ref 7–15)
AST SERPL W P-5'-P-CCNC: 64 U/L (ref 0–45)
BILIRUB SERPL-MCNC: 0.8 MG/DL
BUN SERPL-MCNC: 14.3 MG/DL (ref 6–20)
CALCIUM SERPL-MCNC: 9.4 MG/DL (ref 8.8–10.4)
CHLORIDE SERPL-SCNC: 104 MMOL/L (ref 98–107)
CREAT SERPL-MCNC: 0.98 MG/DL (ref 0.67–1.17)
EGFRCR SERPLBLD CKD-EPI 2021: >90 ML/MIN/1.73M2
GLUCOSE SERPL-MCNC: 96 MG/DL (ref 70–99)
HCO3 SERPL-SCNC: 23 MMOL/L (ref 22–29)
HOLD SPECIMEN: NORMAL
MAGNESIUM SERPL-MCNC: 1.9 MG/DL (ref 1.7–2.3)
MAGNESIUM SERPL-MCNC: 1.9 MG/DL (ref 1.7–2.3)
PHOSPHATE SERPL-MCNC: 2.7 MG/DL (ref 2.5–4.5)
PHOSPHATE SERPL-MCNC: 3.6 MG/DL (ref 2.5–4.5)
POTASSIUM SERPL-SCNC: 4.3 MMOL/L (ref 3.4–5.3)
POTASSIUM SERPL-SCNC: 4.4 MMOL/L (ref 3.4–5.3)
PROT SERPL-MCNC: 6.8 G/DL (ref 6.4–8.3)
SODIUM SERPL-SCNC: 140 MMOL/L (ref 135–145)

## 2024-12-07 PROCEDURE — 80053 COMPREHEN METABOLIC PANEL: CPT | Performed by: INTERNAL MEDICINE

## 2024-12-07 PROCEDURE — 83735 ASSAY OF MAGNESIUM: CPT | Performed by: INTERNAL MEDICINE

## 2024-12-07 PROCEDURE — 84132 ASSAY OF SERUM POTASSIUM: CPT | Performed by: INTERNAL MEDICINE

## 2024-12-07 PROCEDURE — 250N000011 HC RX IP 250 OP 636: Performed by: INTERNAL MEDICINE

## 2024-12-07 PROCEDURE — 250N000013 HC RX MED GY IP 250 OP 250 PS 637: Performed by: INTERNAL MEDICINE

## 2024-12-07 PROCEDURE — 36415 COLL VENOUS BLD VENIPUNCTURE: CPT | Performed by: INTERNAL MEDICINE

## 2024-12-07 PROCEDURE — 99233 SBSQ HOSP IP/OBS HIGH 50: CPT | Performed by: INTERNAL MEDICINE

## 2024-12-07 PROCEDURE — 250N000013 HC RX MED GY IP 250 OP 250 PS 637: Performed by: PHYSICIAN ASSISTANT

## 2024-12-07 PROCEDURE — 120N000002 HC R&B MED SURG/OB UMMC

## 2024-12-07 PROCEDURE — 258N000003 HC RX IP 258 OP 636: Performed by: INTERNAL MEDICINE

## 2024-12-07 PROCEDURE — 84100 ASSAY OF PHOSPHORUS: CPT | Performed by: INTERNAL MEDICINE

## 2024-12-07 RX ORDER — GABAPENTIN 300 MG/1
600 CAPSULE ORAL EVERY 8 HOURS
Status: DISCONTINUED | OUTPATIENT
Start: 2024-12-10 | End: 2024-12-07

## 2024-12-07 RX ORDER — LIDOCAINE 40 MG/G
CREAM TOPICAL
Status: DISCONTINUED | OUTPATIENT
Start: 2024-12-07 | End: 2024-12-10 | Stop reason: HOSPADM

## 2024-12-07 RX ORDER — GABAPENTIN 600 MG/1
1200 TABLET ORAL ONCE
Status: DISCONTINUED | OUTPATIENT
Start: 2024-12-07 | End: 2024-12-07

## 2024-12-07 RX ORDER — CLONIDINE HYDROCHLORIDE 0.1 MG/1
0.1 TABLET ORAL EVERY 8 HOURS
Status: DISCONTINUED | OUTPATIENT
Start: 2024-12-07 | End: 2024-12-10 | Stop reason: HOSPADM

## 2024-12-07 RX ORDER — HYDRALAZINE HYDROCHLORIDE 10 MG/1
10 TABLET, FILM COATED ORAL EVERY 4 HOURS PRN
Status: DISCONTINUED | OUTPATIENT
Start: 2024-12-07 | End: 2024-12-07

## 2024-12-07 RX ORDER — DIAZEPAM 10 MG/1
10 TABLET ORAL EVERY 30 MIN PRN
Status: DISCONTINUED | OUTPATIENT
Start: 2024-12-07 | End: 2024-12-10 | Stop reason: HOSPADM

## 2024-12-07 RX ORDER — AMOXICILLIN 250 MG
2 CAPSULE ORAL 2 TIMES DAILY PRN
Status: DISCONTINUED | OUTPATIENT
Start: 2024-12-07 | End: 2024-12-10 | Stop reason: HOSPADM

## 2024-12-07 RX ORDER — FLUMAZENIL 0.1 MG/ML
0.2 INJECTION, SOLUTION INTRAVENOUS
Status: DISCONTINUED | OUTPATIENT
Start: 2024-12-07 | End: 2024-12-10 | Stop reason: HOSPADM

## 2024-12-07 RX ORDER — ACETAMINOPHEN 325 MG/1
650 TABLET ORAL EVERY 4 HOURS PRN
Status: DISCONTINUED | OUTPATIENT
Start: 2024-12-07 | End: 2024-12-10 | Stop reason: HOSPADM

## 2024-12-07 RX ORDER — GABAPENTIN 600 MG/1
1200 TABLET ORAL ONCE
Status: COMPLETED | OUTPATIENT
Start: 2024-12-07 | End: 2024-12-07

## 2024-12-07 RX ORDER — GABAPENTIN 100 MG/1
100 CAPSULE ORAL EVERY 8 HOURS
Status: DISCONTINUED | OUTPATIENT
Start: 2024-12-14 | End: 2024-12-10 | Stop reason: HOSPADM

## 2024-12-07 RX ORDER — ONDANSETRON 2 MG/ML
4 INJECTION INTRAMUSCULAR; INTRAVENOUS EVERY 6 HOURS PRN
Status: DISCONTINUED | OUTPATIENT
Start: 2024-12-07 | End: 2024-12-10 | Stop reason: HOSPADM

## 2024-12-07 RX ORDER — GABAPENTIN 300 MG/1
900 CAPSULE ORAL EVERY 8 HOURS
Status: COMPLETED | OUTPATIENT
Start: 2024-12-07 | End: 2024-12-10

## 2024-12-07 RX ORDER — GABAPENTIN 300 MG/1
300 CAPSULE ORAL EVERY 8 HOURS
Status: DISCONTINUED | OUTPATIENT
Start: 2024-12-12 | End: 2024-12-07

## 2024-12-07 RX ORDER — GABAPENTIN 300 MG/1
300 CAPSULE ORAL EVERY 8 HOURS
Status: DISCONTINUED | OUTPATIENT
Start: 2024-12-12 | End: 2024-12-10 | Stop reason: HOSPADM

## 2024-12-07 RX ORDER — GABAPENTIN 300 MG/1
900 CAPSULE ORAL EVERY 8 HOURS
Status: DISCONTINUED | OUTPATIENT
Start: 2024-12-07 | End: 2024-12-07

## 2024-12-07 RX ORDER — METOPROLOL TARTRATE 1 MG/ML
5 INJECTION, SOLUTION INTRAVENOUS EVERY 6 HOURS PRN
Status: DISCONTINUED | OUTPATIENT
Start: 2024-12-07 | End: 2024-12-10 | Stop reason: HOSPADM

## 2024-12-07 RX ORDER — GABAPENTIN 100 MG/1
100 CAPSULE ORAL EVERY 8 HOURS
Status: DISCONTINUED | OUTPATIENT
Start: 2024-12-14 | End: 2024-12-07

## 2024-12-07 RX ORDER — CLONIDINE HYDROCHLORIDE 0.1 MG/1
0.1 TABLET ORAL EVERY 8 HOURS
Status: DISCONTINUED | OUTPATIENT
Start: 2024-12-07 | End: 2024-12-07

## 2024-12-07 RX ORDER — CALCIUM CARBONATE 500 MG/1
1000 TABLET, CHEWABLE ORAL 4 TIMES DAILY PRN
Status: DISCONTINUED | OUTPATIENT
Start: 2024-12-07 | End: 2024-12-10 | Stop reason: HOSPADM

## 2024-12-07 RX ORDER — ACETAMINOPHEN 650 MG/1
650 SUPPOSITORY RECTAL EVERY 4 HOURS PRN
Status: DISCONTINUED | OUTPATIENT
Start: 2024-12-07 | End: 2024-12-10 | Stop reason: HOSPADM

## 2024-12-07 RX ORDER — HALOPERIDOL 5 MG/ML
2.5-5 INJECTION INTRAMUSCULAR EVERY 6 HOURS PRN
Status: DISCONTINUED | OUTPATIENT
Start: 2024-12-07 | End: 2024-12-10 | Stop reason: HOSPADM

## 2024-12-07 RX ORDER — ACETAMINOPHEN 325 MG/1
325-650 TABLET ORAL EVERY 6 HOURS PRN
Status: DISCONTINUED | OUTPATIENT
Start: 2024-12-07 | End: 2024-12-07

## 2024-12-07 RX ORDER — ONDANSETRON 4 MG/1
4 TABLET, ORALLY DISINTEGRATING ORAL EVERY 6 HOURS PRN
Status: DISCONTINUED | OUTPATIENT
Start: 2024-12-07 | End: 2024-12-10 | Stop reason: HOSPADM

## 2024-12-07 RX ORDER — AMOXICILLIN 250 MG
1 CAPSULE ORAL 2 TIMES DAILY PRN
Status: DISCONTINUED | OUTPATIENT
Start: 2024-12-07 | End: 2024-12-10 | Stop reason: HOSPADM

## 2024-12-07 RX ORDER — OLANZAPINE 5 MG/1
5-10 TABLET, ORALLY DISINTEGRATING ORAL EVERY 6 HOURS PRN
Status: DISCONTINUED | OUTPATIENT
Start: 2024-12-07 | End: 2024-12-10 | Stop reason: HOSPADM

## 2024-12-07 RX ORDER — HYDRALAZINE HYDROCHLORIDE 20 MG/ML
10 INJECTION INTRAMUSCULAR; INTRAVENOUS EVERY 6 HOURS PRN
Status: DISCONTINUED | OUTPATIENT
Start: 2024-12-07 | End: 2024-12-10 | Stop reason: HOSPADM

## 2024-12-07 RX ORDER — DIAZEPAM 10 MG/2ML
5-10 INJECTION, SOLUTION INTRAMUSCULAR; INTRAVENOUS EVERY 30 MIN PRN
Status: DISCONTINUED | OUTPATIENT
Start: 2024-12-07 | End: 2024-12-10 | Stop reason: HOSPADM

## 2024-12-07 RX ORDER — GABAPENTIN 300 MG/1
600 CAPSULE ORAL EVERY 8 HOURS
Status: DISCONTINUED | OUTPATIENT
Start: 2024-12-10 | End: 2024-12-10 | Stop reason: HOSPADM

## 2024-12-07 RX ADMIN — SODIUM CHLORIDE 1000 ML: 9 INJECTION, SOLUTION INTRAVENOUS at 13:19

## 2024-12-07 RX ADMIN — DIAZEPAM 10 MG: 5 TABLET ORAL at 11:30

## 2024-12-07 RX ADMIN — DIAZEPAM 10 MG: 5 TABLET ORAL at 07:11

## 2024-12-07 RX ADMIN — GABAPENTIN 1200 MG: 600 TABLET, FILM COATED ORAL at 13:33

## 2024-12-07 RX ADMIN — HYDRALAZINE HYDROCHLORIDE 10 MG: 20 INJECTION INTRAMUSCULAR; INTRAVENOUS at 08:59

## 2024-12-07 RX ADMIN — FOLIC ACID 1 MG: 1 TABLET ORAL at 08:58

## 2024-12-07 RX ADMIN — Medication 1 TABLET: at 08:58

## 2024-12-07 RX ADMIN — HYDRALAZINE HYDROCHLORIDE 10 MG: 20 INJECTION INTRAMUSCULAR; INTRAVENOUS at 16:43

## 2024-12-07 RX ADMIN — CLONIDINE HYDROCHLORIDE 0.1 MG: 0.1 TABLET ORAL at 13:04

## 2024-12-07 RX ADMIN — THIAMINE HCL TAB 100 MG 100 MG: 100 TAB at 08:58

## 2024-12-07 RX ADMIN — DIAZEPAM 10 MG: 5 TABLET ORAL at 13:34

## 2024-12-07 RX ADMIN — DIAZEPAM 10 MG: 5 TABLET ORAL at 08:58

## 2024-12-07 RX ADMIN — DIAZEPAM 10 MG: 5 TABLET ORAL at 02:53

## 2024-12-07 ASSESSMENT — ACTIVITIES OF DAILY LIVING (ADL)
ADLS_ACUITY_SCORE: 18
ADLS_ACUITY_SCORE: 18
ADLS_ACUITY_SCORE: 41
ADLS_ACUITY_SCORE: 41
ADLS_ACUITY_SCORE: 18
ADLS_ACUITY_SCORE: 41
ADLS_ACUITY_SCORE: 18
ADLS_ACUITY_SCORE: 41
ADLS_ACUITY_SCORE: 18
ADLS_ACUITY_SCORE: 18
ADLS_ACUITY_SCORE: 41
ADLS_ACUITY_SCORE: 18
ADLS_ACUITY_SCORE: 41
ADLS_ACUITY_SCORE: 41

## 2024-12-07 NOTE — PLAN OF CARE
6MS ADMISSION    D: Patient admitted/transferred from ED via wheelchair for hypertension.     I: Upon arrival to the unit patient was oriented to room, unit, and call light. Patient s height, weight, and vital signs were obtained. Allergies reviewed. Provider notified of patient s arrival on the unit 1633 - Dr CORTÉS. Adult AVS completed. Head to toe assessment completed. Education assessment completed. Care plan initiated.    A: Hypertensive upon admission. Patient rates pain at 0/10. Two RN skin assessment completed Yes. Second RN was TODD. Significant Skin Findings include scratches.     P: Continue to monitor patient s hypertension and intervene as needed. Continue with plan of care. Notify provider with any concerns or changes in patient status.        Problem: Adult Inpatient Plan of Care  Goal: Readiness for Transition of Care  Intervention: Mutually Develop Transition Plan  Recent Flowsheet Documentation  Taken 12/7/2024 1652 by Austin Lawson, FARNDY  Equipment Currently Used at Home: none     Problem: Alcohol Withdrawal  Goal: Alcohol Withdrawal Symptom Control  Outcome: Not Progressing    VS:  Temp: 98.7  F (37.1  C) Temp src: Oral BP: (!) 161/114 Pulse: 89   Resp: 16 SpO2: 97 % O2 Device: None (Room air)       O2: SpO2 > 97 and stable on RA. LS clear and equal bilaterally. Denies chest pain and SOB.    Output: Voids spontaneously without difficulty to bathroom    Last BM: 12/07/2024, denies abdominal discomfort. BS active / passing flatus.    Activity:  independent   Skin: WDL except, scratches   Pain:  0/10    CMS: Intact, AOx4.    Dressing:  none   Diet: Regular diet. Denies nausea/vomiting.    LDA: L PIV SL    Equipment: IV pole, personal belongings, monitor   Plan: standard precautions maintained / Continue with plan of care. Call light within reach, pt able to make needs known.   Plan: CIWA   Additional Info:        [id number] rechecked the patient if he has chest pain, lightheadedness, dizziness

## 2024-12-07 NOTE — PROGRESS NOTES
Received report to admit patient to station 3 A. RN in the ED checked vital signs. Later the ED called the station, 3 A and stated that pt could not come up due to his vital signs not stable. His BP was high and did not meet the criteria to come to station 3 A. ED gave report on a different pt who they said was stable enough to come up and writer received report on the other pt.

## 2024-12-07 NOTE — PHARMACY-ADMISSION MEDICATION HISTORY
Pharmacist Admission Medication History    Admission medication history is complete. The information provided in this note is only as accurate as the sources available at the time of the update.    Information Source(s): Patient via in-person    Pertinent Information: patient isn't prescribed ondansetron, but will use wife's prescription    Changes made to PTA medication list:  Added: acetaminophen, ondansetron  Deleted: nystatin  Changed: None    Allergies reviewed with patient and updates made in EHR: yes    Medication History Completed By: Vicky Day RPH 12/6/2024 6:03 PM    PTA Med List   Medication Sig Last Dose/Taking    acetaminophen (TYLENOL) 325 MG tablet Take 325-650 mg by mouth every 6 hours as needed for mild pain. Past Week    ondansetron (ZOFRAN) 4 MG tablet Take 4 mg by mouth every 8 hours as needed for nausea. Past Week

## 2024-12-07 NOTE — TELEPHONE ENCOUNTER
2:57am - [facility] ED called and stated pt will no longer be admitting to 3A due to high blood pressure and not medically cleared for Detox 3A.     Pt taken out of the queue and [facility] updated. Intake no longer following.

## 2024-12-07 NOTE — PROGRESS NOTES
[facility]    Medicine Progress Note - Hospitalist Service, GOLD TEAM 16    Date of Admission:  12/6/2024    Assessment & Plan   39 year old male with alcohol use disorder admitted on 12/6/2024 for evaluation of alcohol withdrawal. Patient was started on CIWA protocol. Patient found with significant hypertension, no previous hx of hypertension. Also was found with abnormal LFT's secondary to alcohol abuse.    Today's updates 12/6/24  -Patient still at the ER.   -Patient continues with upper extremity tremors.   -Elevated BP most likely secondary to active alcohol withdrawal. He received IV Hydralazine.   -Patient was started on PO Clonidine and PO Gabapentin.   -Patient is clinically dehydrated and received another 1 lt of NS.     -Per pt's request and I called pt's wife Janell Torres and gave her an update.      Acute alcohol withdrawal  - CIWA scores  - Diazepam per CIWA  - zofran for nausea and vomiting  - clonidine for withdrawal symptoms  - gabapentin taper  - haldol/zyprexa as needed for hallucinations  - General diet  - Chemical dependency consult     Tachycardia  Casiano Parkinson White  - Note on EKG on admission, this is new  - Continue on telemetry  - Cardiology discussed with ED provider, EP referral at discharge  - Continue monitoring electrolytes      Hyponatremia  Anion gap metabolic acidosis  - Monitor      AST/ALT elevation  Presumed alcohol related hepatitis  - recommend alcohol cessation, continue monitoring LFT's         Diet: Regular Diet Adult  DVT Prophylaxis: Pneumatic Compression Devices and Anti-embolisim stockings (TEDs)  Banuelos Catheter: Not present  Lines: None     Cardiac Monitoring: None  Code Status: Full Code      Clinically Significant Risk Factors Present on Admission         # Hyponatremia: Lowest Na = 134 mmol/L in last 2 days, will monitor as appropriate  # Hypochloremia: Lowest Cl = 92 mmol/L in last 2 days, will monitor as appropriate      # Anion Gap Metabolic Acidosis: Highest Anion Gap = 19 mmol/L in last 2 days, will monitor and treat as appropriate                           Social Drivers of Health            Disposition Plan     Medically Ready for Discharge: Anticipated in 2-4 Days             Kobe Lima MD  Hospitalist Service, GOLD TEAM 16  [facility]  Securely message with [employer] (more info)  Text page via [employer] Paging/Directory   See signed in provider for up to date coverage information  ______________________________________________________________________    Interval History     Acute events as above.   Patient was pleasant.     Physical Exam   Vital Signs: Temp: (P) 97.8  F (36.6  C) Temp src: (P) Oral BP: (!) (P) 174/108 Pulse: (!) (P) 129   Resp: (P) 16 SpO2: (P) 97 % O2 Device: (P) None (Room air)    Weight: 190 lbs 0 oz    General Appearance: Alert, no acute distress, room air, oral mucosa moist  Respiratory: Normal respiratory effort, clear lungs, no crackles or wheezing  Cardiovascular: Tachycardic, no murmur  GI: Abdomen soft, + BS, no tenderness to palpation  Skin: No rash  Other: Alert, oriented, + upper extremity tremors    Medical Decision Making       50 MINUTES SPENT BY ME on the date of service doing chart review, history, exam, documentation & further activities per the note.      Data     I have personally reviewed the following data over the past 24 hrs:    10.4  \   16.5   / 165     134 (L) 92 (L) 11.1 /  189 (H)   3.8 23 0.86 \     ALT: 108 (H) AST: 110 (H) AP: 76 TBILI: 1.5 (H)   ALB: 5.2 TOT PROTEIN: 8.4 (H) LIPASE: N/A     Trop: 19 BNP: N/A     TSH: 0.64 T4: N/A A1C: N/A       Imaging results reviewed over the past 24 hrs:   No results found for this or any previous visit (from the past 24 hours).

## 2024-12-07 NOTE — H&P
[facility]    History and Physical - Hospitalist Service, GOLD TEAM        Date of Admission:  12/6/2024    Assessment & Plan      Bean Huynh is a 39 year old male with alcohol use disorder admitted on 12/6/2024. He is admitted for alcohol withdrawal and station 3A is full currently. He has not had complicated withdrawal in the past.    Acute alcohol withdrawal  - CIWA scores  - Diazepam per CIWA  - zofran for nausea and vomiting  - clonidine for withdrawal symptoms  - gabapentin taper  - haldol/zyprexa as needed for hallucinations  - General diet  - check BMP in the AM to monitor electrolytes  - Chemical dependency consult, patient is aware and would like supports    Tachycardia  Casiano Parkinson White  - Note on EKG on admission, this is new  - Consider repeat EKG close to discharge  - Cardiology discussed with ED provider, EP referral at discharge  - BMP in the AM    Hyponatremia  Anion gap metabolic acidosis  - due to increased lactate in the setting of poor PO intake and alcohol  - monitor BMP post fluid resuscitation    AST/ALT elevation  Presumed alcohol related hepatitis  - recommend alcohol cessation          Diet:  General diet  DVT Prophylaxis: Pneumatic Compression Devices  Banuelos Catheter: Not present  Lines: None     Cardiac Monitoring: None  Code Status:  FULL CODE    Clinically Significant Risk Factors Present on Admission         # Hyponatremia: Lowest Na = 134 mmol/L in last 2 days, will monitor as appropriate  # Hypochloremia: Lowest Cl = 92 mmol/L in last 2 days, will monitor as appropriate     # Anion Gap Metabolic Acidosis: Highest Anion Gap = 19 mmol/L in last 2 days, will monitor and treat as appropriate                           Disposition Plan     Medically Ready for Discharge: Anticipated in 2-4 Days           Ankit Almazan MD  Hospitalist Service, GOLD TEAM   [facility]  Securely message  with Rm (more info)  Text page via [facility] Paging/Directory   See signed in provider for up to date coverage information    ______________________________________________________________________    Chief Complaint   Desire for detox    History is obtained from the patient    History of Present Illness   Bean Huynh is a 39 year old male with alcohol use disorder who presented to the ED for alcohol withdrawal. In the last 3 weeks he has been drinking 0.5-1L vodka per day. He doesn't feel good today, last drink was 9pm last night. He is here with his wife. He has tremors, increased nervousness and anxiety, he feels like his heart is racing.     He has not gone through detox before. He has not had hallucinations or seizures during detox. He has not needed to be in the ICU for detox. He is open to chem dependency evaluation.       Past Medical History    Past Medical History:   Diagnosis Date    NO ACTIVE PROBLEMS        Past Surgical History   Past Surgical History:   Procedure Laterality Date    s/p wisdom teeth extraction         Prior to Admission Medications   Prior to Admission Medications   Prescriptions Last Dose Informant Patient Reported? Taking?   acetaminophen (TYLENOL) 325 MG tablet Past Week  Yes Yes   Sig: Take 325-650 mg by mouth every 6 hours as needed for mild pain.   ondansetron (ZOFRAN) 4 MG tablet Past Week  Yes Yes   Sig: Take 4 mg by mouth every 8 hours as needed for nausea.      Facility-Administered Medications: None        Social History   I have reviewed this patient's social history and updated it with pertinent information if needed.  Social History     Tobacco Use    Smoking status: Never    Smokeless tobacco: Never   Vaping Use    Vaping status: Never Used   Substance Use Topics    Alcohol use: Yes     Alcohol/week: 5.0 standard drinks of alcohol     Types: 5 Standard drinks or equivalent per week     Comment: social    Drug use: No        Physical Exam   Vital Signs: Temp: 98.2   F (36.8  C) Temp src: Oral BP: (!) 174/117 Pulse: 94   Resp: 18 SpO2: 100 % O2 Device: None (Room air)    Weight: 190 lbs 0 oz    Gen: NAD, sitting comfortably in bed  CV: RRR, no murmurs, 2+ radial pulses  RESP: CTA bilaterally, no w/r/c  Abd: soft, nontender, nondistended  Ext: no edema bilaterally    Medical Decision Making       70 MINUTES SPENT BY ME on the date of service doing chart review, history, exam, documentation & further activities per the note.      Data     I have personally reviewed the following data over the past 24 hrs:    10.4  \   16.5   / 165     134 (L) 92 (L) 11.1 /  189 (H)   3.8 23 0.86 \     ALT: 108 (H) AST: 110 (H) AP: 76 TBILI: 1.5 (H)   ALB: 5.2 TOT PROTEIN: 8.4 (H) LIPASE: N/A     Trop: 19 BNP: N/A     TSH: 0.64 T4: N/A A1C: N/A       Imaging results reviewed over the past 24 hrs:   No results found for this or any previous visit (from the past 24 hours).

## 2024-12-07 NOTE — ED NOTES
Patient BP remains elevated. ED provider updated overnight. Being admitted to medical vs 3 A detox due to the high BP. Denies history of HTN or being on BP meds. Denies chest pain. BP this morning 206/145. Gold team Hospitalist Dr Bullock updated via [email or web address]. Awaiting response. Valium 10 mg given for MSSA score of 9. Continue the plan of care.

## 2024-12-08 LAB
ALBUMIN SERPL BCG-MCNC: 4.4 G/DL (ref 3.5–5.2)
ALP SERPL-CCNC: 57 U/L (ref 40–150)
ALT SERPL W P-5'-P-CCNC: 72 U/L (ref 0–70)
ANION GAP SERPL CALCULATED.3IONS-SCNC: 11 MMOL/L (ref 7–15)
AST SERPL W P-5'-P-CCNC: 63 U/L (ref 0–45)
BILIRUB DIRECT SERPL-MCNC: <0.2 MG/DL (ref 0–0.3)
BILIRUB SERPL-MCNC: 0.9 MG/DL
BUN SERPL-MCNC: 14.5 MG/DL (ref 6–20)
CALCIUM SERPL-MCNC: 9.4 MG/DL (ref 8.8–10.4)
CHLORIDE SERPL-SCNC: 102 MMOL/L (ref 98–107)
CREAT SERPL-MCNC: 1.04 MG/DL (ref 0.67–1.17)
EGFRCR SERPLBLD CKD-EPI 2021: >90 ML/MIN/1.73M2
GLUCOSE SERPL-MCNC: 89 MG/DL (ref 70–99)
HCO3 SERPL-SCNC: 25 MMOL/L (ref 22–29)
HOLD SPECIMEN: NORMAL
MAGNESIUM SERPL-MCNC: 1.9 MG/DL (ref 1.7–2.3)
PHOSPHATE SERPL-MCNC: 2.6 MG/DL (ref 2.5–4.5)
POTASSIUM SERPL-SCNC: 3.9 MMOL/L (ref 3.4–5.3)
PROT SERPL-MCNC: 6.8 G/DL (ref 6.4–8.3)
SODIUM SERPL-SCNC: 138 MMOL/L (ref 135–145)

## 2024-12-08 PROCEDURE — 250N000011 HC RX IP 250 OP 636: Performed by: INTERNAL MEDICINE

## 2024-12-08 PROCEDURE — 84100 ASSAY OF PHOSPHORUS: CPT | Performed by: INTERNAL MEDICINE

## 2024-12-08 PROCEDURE — 80069 RENAL FUNCTION PANEL: CPT | Performed by: INTERNAL MEDICINE

## 2024-12-08 PROCEDURE — 82565 ASSAY OF CREATININE: CPT | Performed by: INTERNAL MEDICINE

## 2024-12-08 PROCEDURE — 83735 ASSAY OF MAGNESIUM: CPT | Performed by: INTERNAL MEDICINE

## 2024-12-08 PROCEDURE — 250N000013 HC RX MED GY IP 250 OP 250 PS 637: Performed by: INTERNAL MEDICINE

## 2024-12-08 PROCEDURE — 36415 COLL VENOUS BLD VENIPUNCTURE: CPT | Performed by: INTERNAL MEDICINE

## 2024-12-08 PROCEDURE — 120N000002 HC R&B MED SURG/OB UMMC

## 2024-12-08 PROCEDURE — 84075 ASSAY ALKALINE PHOSPHATASE: CPT | Performed by: INTERNAL MEDICINE

## 2024-12-08 PROCEDURE — 82310 ASSAY OF CALCIUM: CPT | Performed by: INTERNAL MEDICINE

## 2024-12-08 PROCEDURE — 99232 SBSQ HOSP IP/OBS MODERATE 35: CPT | Performed by: INTERNAL MEDICINE

## 2024-12-08 PROCEDURE — 250N000009 HC RX 250: Performed by: INTERNAL MEDICINE

## 2024-12-08 PROCEDURE — 84155 ASSAY OF PROTEIN SERUM: CPT | Performed by: INTERNAL MEDICINE

## 2024-12-08 RX ORDER — AMLODIPINE BESYLATE 5 MG/1
5 TABLET ORAL DAILY
Status: DISCONTINUED | OUTPATIENT
Start: 2024-12-08 | End: 2024-12-09

## 2024-12-08 RX ADMIN — FOLIC ACID 1 MG: 1 TABLET ORAL at 08:04

## 2024-12-08 RX ADMIN — CLONIDINE HYDROCHLORIDE 0.1 MG: 0.1 TABLET ORAL at 13:14

## 2024-12-08 RX ADMIN — CLONIDINE HYDROCHLORIDE 0.1 MG: 0.1 TABLET ORAL at 05:54

## 2024-12-08 RX ADMIN — HYDRALAZINE HYDROCHLORIDE 10 MG: 20 INJECTION INTRAMUSCULAR; INTRAVENOUS at 08:13

## 2024-12-08 RX ADMIN — Medication 1 TABLET: at 08:04

## 2024-12-08 RX ADMIN — METOPROLOL TARTRATE 5 MG: 1 INJECTION, SOLUTION INTRAVENOUS at 13:14

## 2024-12-08 RX ADMIN — HYDRALAZINE HYDROCHLORIDE 10 MG: 20 INJECTION INTRAMUSCULAR; INTRAVENOUS at 16:53

## 2024-12-08 RX ADMIN — CLONIDINE HYDROCHLORIDE 0.1 MG: 0.1 TABLET ORAL at 21:34

## 2024-12-08 RX ADMIN — THIAMINE HCL TAB 100 MG 100 MG: 100 TAB at 08:04

## 2024-12-08 RX ADMIN — AMLODIPINE BESYLATE 5 MG: 5 TABLET ORAL at 11:02

## 2024-12-08 RX ADMIN — GABAPENTIN 900 MG: 300 CAPSULE ORAL at 05:54

## 2024-12-08 RX ADMIN — GABAPENTIN 900 MG: 300 CAPSULE ORAL at 21:34

## 2024-12-08 RX ADMIN — GABAPENTIN 900 MG: 300 CAPSULE ORAL at 13:13

## 2024-12-08 ASSESSMENT — ACTIVITIES OF DAILY LIVING (ADL)
ADLS_ACUITY_SCORE: 18
ADLS_ACUITY_SCORE: 23
ADLS_ACUITY_SCORE: 18
ADLS_ACUITY_SCORE: 23
ADLS_ACUITY_SCORE: 23
ADLS_ACUITY_SCORE: 18
ADLS_ACUITY_SCORE: 18
ADLS_ACUITY_SCORE: 23
ADLS_ACUITY_SCORE: 18
ADLS_ACUITY_SCORE: 18
ADLS_ACUITY_SCORE: 23
ADLS_ACUITY_SCORE: 18
ADLS_ACUITY_SCORE: 18

## 2024-12-08 NOTE — PROGRESS NOTES
"  Reason for Disposition   Earache    Answer Assessment - Initial Assessment Questions  1. ONSET: "When did the nasal discharge start?"       Last night   2. AMOUNT: "How much discharge is there?"       Yellowish   3. COUGH: "Do you have a cough?" If yes, ask: "Describe the color of your sputum" (clear, white, yellow, green)      Yes, "a dry cough"  4. RESPIRATORY DISTRESS: "Describe your breathing."       Moderate congested breathing "feels like I'm unable to get a full breath"  5. FEVER: "Do you have a fever?" If so, ask: "What is your temperature, how was it measured, and when did it start?"      I get chills sometimes at night or "can't get warm enough"  6. SEVERITY: "Overall, how bad are you feeling right now?" (e.g., doesn't interfere with normal activities, staying home from school/work, staying in bed)       Ill   7. OTHER SYMPTOMS: "Do you have any other symptoms?" (e.g., sore throat, earache, wheezing, vomiting)      Sore throat (OTC Chloraseptic)/right earache  8. PREGNANCY: "Is there any chance you are pregnant?" "When was your last menstrual period?"      No    Protocols used: ST COMMON COLD-A-AH    " [facility]    Medicine Progress Note - Hospitalist Service, GOLD TEAM 16    Date of Admission:  12/6/2024    Assessment & Plan   39 year old male with alcohol use disorder admitted on 12/6/2024 for evaluation of alcohol withdrawal. Patient was started on CIWA protocol. Patient found with significant hypertension, no previous hx of hypertension. Also was found with abnormal LFT's secondary to alcohol abuse.    Today's updates 12/8  -Patient was admitted to medical floor last night. No acute events overnight. He was able to sleep few hours last night. Alcohol withdrawal is significantly improved. He is having low CIWA scores. Upper extremity tremors improved.   -Patient continues with elevated BP. He is telling me that he was found with elevated BP few years ago but this was never treated, he doesn't have a PCP. Most likely he has untreated baseline hypertension and this was exacerbated by alcohol withdrawal. I started PO amlodipine today.      Acute alcohol withdrawal  - CIWA scores. Benzos as needed.   - zofran for nausea and vomiting  - clonidine for withdrawal symptoms  - gabapentin taper  - haldol/zyprexa as needed for hallucinations  - General diet  - Chemical dependency consult  -  consult, he is interested on inpatient treatment options     Hypertension   - Continue monitoring BP. Continue on PO amlodipine. Hydralazine as needed.      Tachycardia  Casiano Parkinson White  - Note on EKG on admission, this is new  - Continue on telemetry  - Cardiology discussed with ED provider, EP referral at discharge  - Continue monitoring electrolytes      Hyponatremia  Anion gap metabolic acidosis  - Monitor      AST/ALT elevation  Presumed alcohol related hepatitis  - recommend alcohol cessation, continue monitoring LFT's         Diet: Combination Diet Regular Diet Adult  DVT Prophylaxis: Pneumatic Compression Devices and Anti-embolisim stockings (TEDs)  Banuelos Catheter: Not  "present  Lines: None     Cardiac Monitoring: None  Code Status: Full Code      Clinically Significant Risk Factors         # Hyponatremia: Lowest Na = 134 mmol/L in last 2 days, will monitor as appropriate  # Hypochloremia: Lowest Cl = 92 mmol/L in last 2 days, will monitor as appropriate     # Anion Gap Metabolic Acidosis: Highest Anion Gap = 19 mmol/L in last 2 days, will monitor and treat as appropriate                 # Overweight: Estimated body mass index is 27.9 kg/m  as calculated from the following:    Height as of this encounter: 1.778 m (5' 10\").    Weight as of this encounter: 88.2 kg (194 lb 7.1 oz)., PRESENT ON ADMISSION            Social Drivers of Health            Disposition Plan     Medically Ready for Discharge: Anticipated in 2-4 Days             Kobe Lima MD  Hospitalist Service, GOLD TEAM 16  [facility]  Securely message with [employer] (more info)  Text page via [employer] Paging/Directory   See signed in provider for up to date coverage information  ______________________________________________________________________    Interval History     Acute events as above.   Patient was pleasant.   Family at bedside.     Physical Exam   Vital Signs: Temp: 97.9  F (36.6  C) Temp src: Oral BP: (!) 181/112 Pulse: 86   Resp: 16 SpO2: 99 % O2 Device: None (Room air)    Weight: 194 lbs 7.13 oz    General Appearance: Alert, no acute distress, room air, oral mucosa moist  Respiratory: Normal respiratory effort, clear lungs, no crackles or wheezing  Cardiovascular: RRR, no murmur  GI: Abdomen soft, + BS, no tenderness to palpation  Other: Alert, oriented, no upper extremity tremors    Medical Decision Making       50 MINUTES SPENT BY ME on the date of service doing chart review, history, exam, documentation & further activities per the note.      Data     I have personally reviewed the following data over the past 24 hrs:    N/A  \   N/A   / N/A     138 102 " 14.5 /  89   3.9 25 1.04 \     ALT: 72 (H) AST: 63 (H) AP: 57 TBILI: 0.9   ALB: 4.4 TOT PROTEIN: 6.8 LIPASE: N/A       Imaging results reviewed over the past 24 hrs:   No results found for this or any previous visit (from the past 24 hours).

## 2024-12-08 NOTE — PROGRESS NOTES
"Shift: 2300 - 0730    VS: Blood pressure (!) 165/94, pulse 84, temperature 97.8  F (36.6  C), temperature source Oral, resp. rate 16, height 1.778 m (5' 10\"), weight 88.2 kg (194 lb 7.1 oz), SpO2 97%.     Pain:  Pt denies any pain this shift.   Neuro: Alert and oriented x4.   Resp: WDL  Diet: Regular diet   Skin: WDL, except scratches   LDA: L-PIV, SL  Activity: Independent   Output: Continent of bowel and bladder.   Additional Info/shift updates: Pt scored a 0 on the CIWA no further concerns.   Call light within reach     Plan of care ongoing   "

## 2024-12-08 NOTE — PLAN OF CARE
Goal Outcome Evaluation:      Plan of Care Reviewed With: patient    Overall Patient Progress: no change    Outcome Evaluation: Patient A&O x4. Able to make needs known and uses call light appropriately. Independent in room. Continent of bowel and bladder. Denies pain. CIWA protocol in place not requiring valium. BP remained elevated throughout the shift. Administered prn hydralazine x2 and prn metoprolol x1. Patient shows interest in IP treatment once medically stable. SW and chem dep to see patient tomorrow. Continue with plan of care.

## 2024-12-08 NOTE — PLAN OF CARE
[id number]:    Patient is alert and ox4, VSS except for elevated BP, on RA.    Denied chest pain, pain, SOB, or N/V.    CIWA 0.     PCD in place.   L YESSENIA Brown.    Call light within reach.     Cont of POC.

## 2024-12-09 LAB
ALBUMIN SERPL BCG-MCNC: 4.2 G/DL (ref 3.5–5.2)
ALP SERPL-CCNC: 57 U/L (ref 40–150)
ALT SERPL W P-5'-P-CCNC: 73 U/L (ref 0–70)
AST SERPL W P-5'-P-CCNC: 61 U/L (ref 0–45)
BILIRUB DIRECT SERPL-MCNC: <0.2 MG/DL (ref 0–0.3)
BILIRUB SERPL-MCNC: 0.7 MG/DL
HOLD SPECIMEN: NORMAL
MAGNESIUM SERPL-MCNC: 1.9 MG/DL (ref 1.7–2.3)
PHOSPHATE SERPL-MCNC: 3.2 MG/DL (ref 2.5–4.5)
PROT SERPL-MCNC: 6.7 G/DL (ref 6.4–8.3)

## 2024-12-09 PROCEDURE — 250N000013 HC RX MED GY IP 250 OP 250 PS 637: Performed by: INTERNAL MEDICINE

## 2024-12-09 PROCEDURE — 120N000002 HC R&B MED SURG/OB UMMC

## 2024-12-09 PROCEDURE — 36415 COLL VENOUS BLD VENIPUNCTURE: CPT | Performed by: INTERNAL MEDICINE

## 2024-12-09 PROCEDURE — 83735 ASSAY OF MAGNESIUM: CPT | Performed by: INTERNAL MEDICINE

## 2024-12-09 PROCEDURE — 82040 ASSAY OF SERUM ALBUMIN: CPT | Performed by: INTERNAL MEDICINE

## 2024-12-09 PROCEDURE — 99232 SBSQ HOSP IP/OBS MODERATE 35: CPT | Performed by: INTERNAL MEDICINE

## 2024-12-09 PROCEDURE — 82247 BILIRUBIN TOTAL: CPT | Performed by: INTERNAL MEDICINE

## 2024-12-09 PROCEDURE — 84100 ASSAY OF PHOSPHORUS: CPT | Performed by: INTERNAL MEDICINE

## 2024-12-09 PROCEDURE — 250N000009 HC RX 250: Performed by: INTERNAL MEDICINE

## 2024-12-09 PROCEDURE — 84155 ASSAY OF PROTEIN SERUM: CPT | Performed by: INTERNAL MEDICINE

## 2024-12-09 RX ORDER — AMLODIPINE BESYLATE 10 MG/1
10 TABLET ORAL DAILY
Status: DISCONTINUED | OUTPATIENT
Start: 2024-12-09 | End: 2024-12-10 | Stop reason: HOSPADM

## 2024-12-09 RX ADMIN — CLONIDINE HYDROCHLORIDE 0.1 MG: 0.1 TABLET ORAL at 21:12

## 2024-12-09 RX ADMIN — Medication 1 TABLET: at 09:33

## 2024-12-09 RX ADMIN — METOPROLOL TARTRATE 5 MG: 1 INJECTION, SOLUTION INTRAVENOUS at 00:12

## 2024-12-09 RX ADMIN — THIAMINE HCL TAB 100 MG 100 MG: 100 TAB at 09:34

## 2024-12-09 RX ADMIN — CLONIDINE HYDROCHLORIDE 0.1 MG: 0.1 TABLET ORAL at 06:16

## 2024-12-09 RX ADMIN — CLONIDINE HYDROCHLORIDE 0.1 MG: 0.1 TABLET ORAL at 16:18

## 2024-12-09 RX ADMIN — FOLIC ACID 1 MG: 1 TABLET ORAL at 09:34

## 2024-12-09 RX ADMIN — GABAPENTIN 900 MG: 300 CAPSULE ORAL at 16:18

## 2024-12-09 RX ADMIN — AMLODIPINE BESYLATE 10 MG: 10 TABLET ORAL at 09:33

## 2024-12-09 RX ADMIN — GABAPENTIN 900 MG: 300 CAPSULE ORAL at 21:11

## 2024-12-09 RX ADMIN — GABAPENTIN 900 MG: 300 CAPSULE ORAL at 06:16

## 2024-12-09 ASSESSMENT — ACTIVITIES OF DAILY LIVING (ADL)
ADLS_ACUITY_SCORE: 35
ADLS_ACUITY_SCORE: 33
DEPENDENT_IADLS:: INDEPENDENT
ADLS_ACUITY_SCORE: 35
ADLS_ACUITY_SCORE: 33
ADLS_ACUITY_SCORE: 40
ADLS_ACUITY_SCORE: 33
ADLS_ACUITY_SCORE: 40
ADLS_ACUITY_SCORE: 23
ADLS_ACUITY_SCORE: 40
ADLS_ACUITY_SCORE: 33
ADLS_ACUITY_SCORE: 35
ADLS_ACUITY_SCORE: 40
ADLS_ACUITY_SCORE: 33
ADLS_ACUITY_SCORE: 40
ADLS_ACUITY_SCORE: 33
ADLS_ACUITY_SCORE: 35
ADLS_ACUITY_SCORE: 40
ADLS_ACUITY_SCORE: 33

## 2024-12-09 NOTE — PROGRESS NOTES
"  Type Of Assessment: Inpatient Substance Use Comprehensive Assessment    Referral Source:  [facility]  MRN: [id number]    DATE OF SERVICE: December 9, 2024  Date of previous NIMCO Assessment: n/a  Patient confirmed identity through two factor verification: Full Legal Name    PATIENT'S NAME: Bean Huynh  Age: 39 year old  Last 4 SSN: [id number]  Sex: male   Gender Identity: male  Sexual Orientation: Heterosexual  Cultural Background: \"white and \"   YOB: 1985  Current Address:   [address] [place] [address]  Patient Phone Number:  [phone]   Patient's E-Mail Contact:  [email or web address]  Funding: [employer] out of [facility]  PMI: NA  Emergency Contact: Extended Emergency Contact Information  Primary Emergency Contact: Janell Torres   [place]  Mobile Phone: [phone]  Relation: Significant other    DAANES information was provided to patient and patient does not want a copy.     Telemedicine Visit: The patient's condition can be safely assessed and treated via synchronous audio and visual telemedicine encounter.    Reason for Telemedicine Visit: Services only offered telehealth  Originating Site (Patient Location): [place] [address]  Distant Site (Provider Location): Provider Remote Setting- Home Office  Consent:  The patient/guardian has verbally consented to: the potential risks and benefits of telemedicine (video visit) versus in person care; bill my insurance or make self-payment for services provided; and responsibility for payment of non-covered services.   Mode of Communication:  telephone    START TIME: 2:41pm  END TIME: 3:11pm    As the provider I attest to compliance with applicable laws and regulations related to telemedicine.   Bean Huynh was seen for a substance use disorder consult on 12/9/2024 by MARQUES Palomino.    Reason for " "Substance Use Disorder Consult:  Pt is interested in NIMCO treatment at this time because \"I am addicted to alcohol and I have been sober for 3 days, but I had to come to the hospital to deal with my withdrawal. It has caused liver damage and high blood pressure.\"    Are you currently having severe withdrawal symptoms that are putting yourself or others in danger? No  Are you currently having severe medical problems that require immediate attention? No  Are you currently having severe emotional or behavioral problems that are putting yourself or others at risk of harm? No    Have you participated in prior substance use disorder evaluations? No   Have you ever been to detox, inpatient or outpatient treatment for substance related use? List previous treatment: No   Have you ever had a gambling problem or had treatment for compulsive gambling? No  Have you ever felt the need to bet more and more money? No  Have you ever had to lie to people important to you about how much you gambled? No    Patient does not appear to be in severe withdrawal, an imminent safety risk to self or others, or requiring immediate medical attention and may proceed with the assessment interview.  Comprehensive Substance Use History   X X = Primary Drug Used Age of First Use    Pattern of Substance Use   (heaviest use in life and a use history within the past year if applicable) (DSM-5: Sx #3) Date /  Quantity of last use if within the past 30 days Withdrawal Potential?   Method of use  (Oral, smoked, snorted, IV, etc)   x Alcohol   14 His drinking as \"light in high school, heavier in college. In the 20s you are going to the bars but I wasn't drinking every day. When I started my career I started drinking more. The heaviest has been the last 3 years.\"    Past year: daily use of 1/2 a liter to a liter of vodka, tequila, or whiskey.    12/5/24 no oral    Marijuana/Hashish   No use        Cocaine/Crack No use        Meth/Amphetamines   No use        " "Heroin   No use        Other Opiates/Synthetics   No use        Inhalants  No use        Benzodiazepines   No use        Hallucinogens   No use        Barbiturates/Sedatives/Hypnotics   No use        Over-the-Counter Drugs   No use        Other   No use        Nicotine   No use         Withdrawal symptoms: Have you had any of the following withdrawal symptoms?  \"unable to sleep, shakes, cold sweats, anxiety, flushed\"    Have you experienced any cravings?  Yes    Have you had periods of abstinence?  Yes   What was your longest period? 3-4  days if he had something important come up.    What activities have you engaged in when using alcohol/other drugs that could be hazardous to you or others?  The patient reported having a history of driving while under the influence of alcohol or drugs.    A description of any risk-taking behavior, including behavior that puts the client at risk of exposure to blood-borne or sexually transmitted diseases: none reported    Arrests and legal interventions related to substance use: none reported.    A description of how the patient's use affected their ability to function appropriately in a work setting: He would drink during work.    A description of how the patient's use affected their ability to function appropriately in an educational setting: It did not.    Leisure time activities that are associated with substance use: He would drink throughout the day.    Do you think your substance use has become a problem for you? He agrees he has a substance abuse problem.    MEDICAL HISTORY  Physical or medical concerns or diagnoses: none reported.  Patient Active Problem List   Diagnosis    Transplant donor evaluation    BMI 32.0-32.9,adult    Dyslipidemia    Abnormal electrocardiogram    Alcohol withdrawal, uncomplicated (H)     Do you have any current medical treatment needs not being addressed by inpatient treatment?  no    Do you need a referral for a medical provider? no    Current " medications:   Current Facility-Administered Medications   Medication Dose Route Frequency Provider Last Rate Last Admin    acetaminophen (TYLENOL) tablet 650 mg  650 mg Oral Q4H PRN Ankit Almazan MD        Or    acetaminophen (TYLENOL) Suppository 650 mg  650 mg Rectal Q4H PRN Ankit Almazan MD        amLODIPine (NORVASC) tablet 10 mg  10 mg Oral Daily Kobe Lima MD   10 mg at 12/09/24 0933    calcium carbonate (TUMS) chewable tablet 1,000 mg  1,000 mg Oral 4x Daily PRN Ankit Almazan MD        cloNIDine (CATAPRES) tablet 0.1 mg  0.1 mg Oral Q8H Ankit Almazan MD   0.1 mg at 12/09/24 0616    diazepam (VALIUM) tablet 10 mg  10 mg Oral Q30 Min PRN Ankit Almazan MD        Or    diazepam (VALIUM) injection 5-10 mg  5-10 mg Intravenous Q30 Min PRN Ankit Almazan MD        flumazenil (ROMAZICON) injection 0.2 mg  0.2 mg Intravenous q1 min prn Ankit Almazan MD        folic acid (FOLVITE) tablet 1 mg  1 mg Oral Daily Ankit Almazan MD   1 mg at 12/09/24 0934    [START ON 12/14/2024] gabapentin (NEURONTIN) capsule 100 mg  100 mg Oral Q8H Ankit Almazan MD        [START ON 12/12/2024] gabapentin (NEURONTIN) capsule 300 mg  300 mg Oral Q8H Ankit Almazan MD        [START ON 12/10/2024] gabapentin (NEURONTIN) capsule 600 mg  600 mg Oral Q8H Ankit Almazan MD        gabapentin (NEURONTIN) capsule 900 mg  900 mg Oral Q8H Ankit Almazan MD   900 mg at 12/09/24 0616    OLANZapine zydis (zyPREXA) ODT tab 5-10 mg  5-10 mg Oral Q6H PRN Ankit Almazan MD        Or    haloperidol lactate (HALDOL) injection 2.5-5 mg  2.5-5 mg Intravenous Q6H PRN Ankit Almazan MD        hydrALAZINE (APRESOLINE) injection 10 mg  10 mg Intravenous Q6H PRN Kobe Lima MD   10 mg at 12/08/24 1650    lidocaine (LMX4) cream   Topical Q1H PRN Ankit Almazan MD        lidocaine 1 % 0.1-1 mL  0.1-1 mL Other Q1H PRN Ankit Almazan MD        melatonin tablet 5 mg  5 mg Oral QPM PRN Ankit Almazan MD        metoprolol  (LOPRESSOR) injection 5 mg  5 mg Intravenous Q6H PRN Kobe Lima MD   5 mg at 12/09/24 0012    multivitamin w/minerals (THERA-VIT-M) tablet 1 tablet  1 tablet Oral Daily Ankit Almazan MD   1 tablet at 12/09/24 0933    ondansetron (ZOFRAN ODT) ODT tab 4 mg  4 mg Oral Q6H PRN Ankit Almazan MD        Or    ondansetron (ZOFRAN) injection 4 mg  4 mg Intravenous Q6H PRN Ankit Almazan MD        senna-docusate (SENOKOT-S/PERICOLACE) 8.6-50 MG per tablet 1 tablet  1 tablet Oral BID PRN Ankit Almazan MD        Or    senna-docusate (SENOKOT-S/PERICOLACE) 8.6-50 MG per tablet 2 tablet  2 tablet Oral BID PRN Ankit Almazan MD        sodium chloride (PF) 0.9% PF flush 3 mL  3 mL Intracatheter Q8H Ankit Almazan MD   3 mL at 12/09/24 0935    sodium chloride (PF) 0.9% PF flush 3 mL  3 mL Intracatheter q1 min prn Ankit Almazan MD        thiamine (B-1) tablet 100 mg  100 mg Oral Daily Ankit Almazan MD   100 mg at 12/09/24 0934       Are you pregnant? NA, Male    Do you have any specific physical needs/accommodations? No    MENTAL HEALTH HISTORY:  Have you ever had MH hospitalizations or treatment for mental health illness: No [place] hospitalizations. He has worked with a therapist a few times.    Mental health history, including diagnosis and symptoms, and the effect on the client's ability to function: none reported.    Current mental health treatment including psychotropic medication needed to maintain stability: (Note: The assessment must utilize screening tools approved by the commissioner pursuant to section 245.4863 to identify whether the client screens positive for co-occurring disorders): none reported.    GAIN-SS Tool:      12/9/2024     2:00 PM   When was the last time that you had significant problems...   with feeling very trapped, lonely, sad, blue, depressed or hopeless about the future? Never   with sleep trouble, such as bad dreams, sleeping restlessly, or falling asleep during the day? Past  Month   with feeling very anxious, nervous, tense, scared, panicked or like something bad was going to happen? Past month   with becoming very distressed & upset when something reminded you of the past? Past month   with thinking about ending your life or committing suicide? Never         12/9/2024     2:00 PM   When was the last time that you did the following things 2 or more times?   Lied or conned to get things you wanted or to avoid having to do something? Past month   Had a hard time paying attention at school, work or home? Past month   Had a hard time listening to instructions at school, work or home? Past month   Were a bully or threatened other people? Past month   Started physical fights with other people? Past month     Have you ever been verbally, emotionally, physically or sexually abused?   No    Family history of substance use and misuse: Sister has been sober for over a decade.    The patient's desire for family involvement in the treatment program: wife and sister.  Level of family support: His wife and sister.    Social network in relation to expected support for recovery: He went to an AA meeting on Thursday.    Are you currently in a significant relationship? Yes.  4B. How long? Together for 8 years. Not legally .              Please describe your significant other's use of mood altering chemicals? No use.    Do you have any children (include living arrangements/custody/contact)?:  yes, a 4 year old son.    What is your current living situation? He lives with his significant other and their son.    Are you employed/attending school? Yes. He has been at the same company for the past 12 years.    SUMMARY:  Ability to understand written treatment materials: Yes  Ability to understand patient rules and patient rights: Yes  Does the patient recognize needs related to substance use and is willing to follow treatment recommendations: Yes  Does the patient have an opioid use disorder:  does not  have a history of opiate use.    ASAM Dimension Scale Ratings:    Dimension 1 -  Acute Intoxication/Withdrawal: 0 - No Problem  Dimension 2 - Biomedical: 0 - No Problem  Dimension 3 - Emotional/Behavioral/Cognitive Conditions: 1 - Minor Problem  Dimension 4 - Readiness to Change:  1 - Minor Problem  Dimension 5 - Relapse/Continued Use/ Continued Problem Potential: 4 - Extreme Problem  Dimension 6 - Recovery Environment:  2 - Moderate Problem    Category of Substance Severity (ICD-10 Code / DSM 5 Code)     Alcohol Use Disorder Severe  (10.20) (303.90)   Cannabis Use Disorder The patient does not meet the criteria for a Cannabis use disorder.   Hallucinogen Use Disorder The patient does not meet the criteria for a Hallucinogen use disorder.   Inhalant Use Disorder The patient does not meet the criteria for an Inhalant use disorder.   Opioid Use Disorder The patient does not meet the criteria for an Opioid use disorder.   Sedative, Hypnotic, or Anxiolytic Use Disorder The patient does not meet the criteria for a Sedative/Hypnotic use disorder.   Stimulant Related Disorder The patient does not meet the criteria for a Stimulant use disorder.   Tobacco Use Disorder The patient does not meet the criteria for a Tobacco use disorder.   Other (or unknown) Substance Use Disorder The patient does not meet the criteria for a Other (or unknown) Substance use disorder.     A problematic pattern of alcohol/drug use leading to clinically significant impairment or distress, as manifested by at least two of the following, occurring within a 12-month period:    1.) Alcohol/drug is often taken in larger amounts or over a longer period than was intended.  2.) There is a persistent desire or unsuccessful efforts to cut down or control alcohol/drug use  3.) A great deal of time is spent in activities necessary to obtain alcohol, use alcohol, or recover from its effects.  4.) Craving, or a strong desire or urge to use alcohol/drug  6.)  Continued alcohol use despite having persistent or recurrent social or interpersonal problems caused or exacerbated by the effects of alcohol/drug.  8.) Recurrent alcohol/drug use in situations in which it is physically hazardous.  9.) Alcohol/drug use is continued despite knowledge of having a persistent or recurrent physical or psychological problem that is likely to have been caused or exacerbated by alcohol.  10.) Tolerance, as defined by either of the following: A need for markedly increased amounts of alcohol/drug to achieve intoxication or desired effect.  11.) Withdrawal, as manifested by either of the following: The characteristic withdrawal syndrome for alcohol/drug (refer to Criteria A and B of the criteria set for alcohol/drug withdrawal).    Specify if: In early remission:  After full criteria for alcohol/drug use disorder were previously met, none of the criteria for alcohol/drug use disorder have been met for at least 3 months but for less than 12 months (with the exception that Criterion A4,  Craving or a strong desire or urge to use alcohol/drug  may be met).     In sustained remission:   After full criteria for alcohol use disorder were previously met, non of the criteria for alcohol/drug use disorder have been met at any time during a period of 12 months or longer (with the exception that Criterion A4,  Craving or strong desire or urge to use alcohol/drug  may be met).     Specify if:   This additional specifier is used if the individual is in an environment where access to alcohol is restricted.    Mild: Presence of 2-3 symptoms  Moderate: Presence of 4-5 symptoms  Severe: Presence of 6 or more symptoms    Collateral information:   The patient's medical record at [facility] was reviewed and the information contained in the medical record supported the patient's account of his chemical use history and chemical use consequences.    Recommendations:   1)  Complete a residential based or  similar treatment program.  2)  Abstain from all mood-altering chemicals unless prescribed by a licensed provider.   3)  Attend, at minimum, 2 weekly support group meetings, such as 12 step based (AA/NA), SMART Recovery, Health Realizations, and/or Refuge Recovery meetings.     4)  Actively work with a male mentor/sponsor on a weekly basis.   5)  Follow all the recommendations of your treatment/medical providers.    Clinical Substantiation:    Pt reports his drinking has been the heaviest over the past 3 years. For the past year he was drank daily and drank often to keep from going into alcohol withdrawal. He has never attended a formal NIMCO treatment program. He lacks sober coping skills.    Referrals/ Alternatives:  Nichole:   Phone: [phone]  Efax: [phone]  email: [email or web address]  [address] [place] [address]  [email or web address]    Ruby Carlton:  Phone: [phone]  Fax: [phone]  email: [email or web address]  [email or web address]    [facility]:  [address] [place] [address]  Admissions Phone: [phone]  [facility] waitlist: [phone]  Business Office: [phone] (Cristal Cavazos)  [email or web address]     NIMCO consult completed by: Meena Gutiérrez [facility].  Phone Number: [phone]  E-mail Address: [email or web address] Mental Health and Addiction Services Evaluation Department  [address] [place] [address]     *Due to regulation of Title 42 of the Code of Federal Regulations (CFR) Part 2: Confidentiality laws apply to this note and the information wherein.  Thus, this note cannot be copy and pasted into any other health care staff's note nor can it be included in general medical records sent to ANY outside agency without the patient's written consent.

## 2024-12-09 NOTE — PLAN OF CARE
"  Problem: Adult Inpatient Plan of Care  Goal: Plan of Care Review  Description:   VS:  /84 (BP Location: Right arm)   Pulse 74   Temp 98.7  F (37.1  C) (Oral)   Resp 18   Ht 1.778 m (5' 10\")   Wt 89.8 kg (198 lb)   SpO2 94%   BMI 28.41 kg/m      O2:  Room Air    Output:  Continent of Bowel and Bladder    Last BM:  12/08/24   Activity:  Independent / SBA   Up for meals?  Yes    Skin:  No Issues    Pain:  Denies    CMS:  A&OX 3-4   Dressing:  None    Diet:  Regular    LDA:  Left PIV SL   Equipment:  Personal Belongings    Plan:  POC   Additional Info:  ETOH      CIWA Score  0  Outcome: Progressing  Goal: Patient-Specific Goal (Individualized)  Description: You can add care plan individualizations to a care plan. Examples of Individualization might be:  \"Parent requests to be called daily at 9am for status\", \"I have a hard time hearing out of my right ear\", or \"Do not touch me to wake me up as it startles  me\".  Outcome: Progressing  Goal: Absence of Hospital-Acquired Illness or Injury  Outcome: Progressing  Goal: Optimal Comfort and Wellbeing  Outcome: Progressing  Goal: Readiness for Transition of Care  Outcome: Progressing     Problem: Alcohol Withdrawal  Goal: Alcohol Withdrawal Symptom Control  Outcome: Progressing     Problem: Comorbidity Management  Goal: Blood Pressure in Desired Range  Outcome: Progressing   Goal Outcome Evaluation:    "

## 2024-12-09 NOTE — CONSULTS
12/9/2024  Pt completed his NIMCO CA today. He is interested in IP NIMCO treatment. He is being referred to [facility] today. I did inform him, his wife, and his sister that room and board may or may not be covered for [facility]. I provided them with Cristal Cavazos's number in the business office.    [facility] Service Initiation Date ID: [id number]    Recommendations:   1)  Complete a residential based or similar treatment program.  2)  Abstain from all mood-altering chemicals unless prescribed by a licensed provider.   3)  Attend, at minimum, 2 weekly support group meetings, such as 12 step based (AA/NA), SMART Recovery, Health Realizations, and/or Refuge Recovery meetings.     4)  Actively work with a male mentor/sponsor on a weekly basis.   5)  Follow all the recommendations of your treatment/medical providers.    Clinical Substantiation:    Pt reports his drinking has been the heaviest over the past 3 years. For the past year he was drank daily and drank often to keep from going into alcohol withdrawal. He has never attended a formal NIMCO treatment program. He lacks sober coping skills.    Referrals/ Alternatives:  Nichole:   Phone: [phone]  Efax: [phone]  email: [email or web address]  [address] [place] [address]  [email or web address]    Ruby Carcamo Carlton:  Phone: [phone]  Fax: [phone]  email: [email or web address]  [email or web address]    [facility]:  [facility] [place] [address]  Admissions Phone: [phone]  [facility] waitlist: [phone]  Business Office: [phone] (Cristal Cavazos)  [email or web address]     NIMCO consult completed by: MARQUES Palomino.  Phone Number: [phone]  E-mail Address: [email or web address] Mental Health and Addiction Services Evaluation Department  [address] [place] [address]     *Due to regulation of Title 42 of the Code of Federal Regulations (CFR) Part 2: Confidentiality laws apply to this note and the information wherein.  Thus, this note cannot be copy and pasted into any other health care staff's note nor can it be included in general medical records sent to ANY outside agency without the patient's written consent.

## 2024-12-09 NOTE — PROGRESS NOTES
[facility]  [address] [place] [address]      12/9/2024      Bean HEIN Lino  [address] [place] [address]      Dear Mr. Huynh,    I have listed the treatment programs below that we spoke about. I will make a referral shortly to [place]. Let me know if you would like your assessment sent to either Nichole or Ruby.  If you have questions, my phone number is [phone].    Thanks,  Pam Varela:   Phone: [phone]  Efax: [phone]  email: [email or web address]  [address] [place] [address]  [email or web address]    Ruby Carlton:  Phone: [phone]  Fax: [phone]  email: [email or web address]  [email or web address]    [facility]:  [address] [place] [address]  Admissions Phone: [phone]  [facility] waitlist: [phone]  Business Office: [phone] (Cristal Cavazos)  [email or web address]    Meena Lakhani MA [facility]  CD Evaluation Counselor  [phone]    (Emailed to pt today)

## 2024-12-09 NOTE — CONSULTS
Care Management Initial Consult    General Information  Assessment completed with: Patient, Spouse or significant other,    Type of CM/SW Visit: Initial Assessment    Primary Care Provider verified and updated as needed: Yes   Readmission within the last 30 days: no previous admission in last 30 days      Reason for Consult: discharge planning, substance use concerns  Advance Care Planning: Advance Care Planning Reviewed: present on chart        Communication Assessment  Patient's communication style: spoken language (English or Bilingual)    Hearing Difficulty or Deaf: no   Wear Glasses or Blind: no    Cognitive  Cognitive/Neuro/Behavioral: WDL                      Living Environment:   People in home: child(robin), dependent, spouse     Current living Arrangements: house      Able to return to prior arrangements: yes     Family/Social Support:  Care provided by: self  Provides care for: child(robin)  Marital Status:   Support system: Wife          Description of Support System: Supportive, Involved    Support Assessment: Adequate family and caregiver support    Current Resources:   Patient receiving home care services: No     Community Resources: None  Equipment currently used at home: none  Supplies currently used at home: None    Employment/Financial:  Employment Status: employed full-time        Financial Concerns: unable to afford rent/mortgage   Referral to Financial Worker: No     Does the patient's insurance plan have a 3 day qualifying hospital stay waiver?  Yes     Which insurance plan 3 day waiver is available? Alternative insurance waiver    Will the waiver be used for post-acute placement? No    Lifestyle & Psychosocial Needs:  Social Drivers of Health     Food Insecurity: Low Risk  (12/7/2024)    Food Insecurity     Within the past 12 months, did you worry that your food would run out before you got money to buy more?: No     Within the past 12 months, did the food you bought just not last and you  didn t have money to get more?: No   Depression: Not at risk (11/13/2024)    PHQ-2     PHQ-2 Score: Incomplete   Housing Stability: Low Risk  (12/7/2024)    Housing Stability     Do you have housing? : Yes     Are you worried about losing your housing?: No   Tobacco Use: Low Risk  (12/6/2024)    Patient History     Smoking Tobacco Use: Never     Smokeless Tobacco Use: Never     Passive Exposure: Not on file   Financial Resource Strain: Low Risk  (12/7/2024)    Financial Resource Strain     Within the past 12 months, have you or your family members you live with been unable to get utilities (heat, electricity) when it was really needed?: No   Alcohol Use: Not on file   Transportation Needs: Low Risk  (12/7/2024)    Transportation Needs     Within the past 12 months, has lack of transportation kept you from medical appointments, getting your medicines, non-medical meetings or appointments, work, or from getting things that you need?: No   Physical Activity: Not on file   Interpersonal Safety: Low Risk  (12/7/2024)    Interpersonal Safety     Do you feel physically and emotionally safe where you currently live?: Yes     Within the past 12 months, have you been hit, slapped, kicked or otherwise physically hurt by someone?: No     Within the past 12 months, have you been humiliated or emotionally abused in other ways by your partner or ex-partner?: No   Stress: Not on file   Social Connections: Not on file   Health Literacy: Not on file     Functional Status:  Prior to admission patient needed assistance:   Dependent ADLs:: Independent  Dependent IADLs:: Independent  Assesssment of Functional Status: At functional baseline    Mental Health Status:  Mental Health Status: No Current Concerns       Chemical Dependency Status:  Chemical Dependency Status: Current Concern           Values/Beliefs:  Spiritual, Cultural Beliefs, Yarsanism Practices, Values that affect care: no             Discussed  Partnership in Safe  Discharge Planning  document with patient/family: No    Additional Information:  Per H&P. patient is a 39 year old male with alcohol use disorder admitted on 12/6/2024. He is admitted for alcohol withdrawal and station 3A is full currently. He has not had complicated withdrawal in the past.     Writer completed initial assessment due to consult placed for chemical dependency concerns. Writer introduced self, role and duties. Patient resides in independent home with wife and 4 year old son. He is independent at baseline with ADLs and IADLs. Patient does not require any assistance or use any medical supplies. He denies any mental health concerns. Patient reports financial concerns related to trouble paying rent. He was provided with resources. Patient confirmed address, phone number, contact, and insurance on file.     He does not have a Primary Care Provider. A [facility] referral was made with his permission. Patient does not have a Healthcare Directive. A form was provided to him to review and he was informed on how to complete. His goal is to discharge to Inpatient Chemical Dependency Treatment. [facility] completed NIMCO Assessment with him. Writer answered his questions and explained different types of treatment and options. Writer explained that it was preferred to send straight to treatment from Hospital, but if patient was medically ready to discharge and no bed was available, then he would have to be discharged to home.    Next Steps:     SW to follow up with [facility] [place] regarding their availability and ability to admit patient.    JAIDEN Hayes, MSW  6th Floor Medical Surgical Unit  Phone [phone]      Message me securely in [employer]

## 2024-12-09 NOTE — PROGRESS NOTES
[id number]     Plan of Care Reviewed With: patient     Overall Patient Progress: no change     Goal Outcome Evaluation:   Patient is alert and oriented X 4, Stable on RA. Afebrile.  Able to make needs known and uses call light appropriately. Independent in room. Continent of B&B. Denies pain. CIWA protocol in place,  scoring low, reported that upper extremity tremors is improving.  L PIV SL  No acute event noted overnight. Call light is within reach. Will Continue to follow plan of care.      Shift Report  Patient received a dose of PRN Metoprolol X 1 this shift for increased BP and HR.         EKG/Imaging Studies/Labs

## 2024-12-09 NOTE — PROGRESS NOTES
"[facility]    Medicine Progress Note - Hospitalist Service, GOLD TEAM 16    Date of Admission:  12/6/2024    Assessment & Plan   39 year old male with alcohol use disorder admitted on 12/6/2024 for evaluation of alcohol withdrawal. Patient was started on CIWA protocol. Patient found with significant hypertension, no previous hx of hypertension. Also was found with abnormal LFT's secondary to alcohol abuse.    Today's updates 12/9  -No acute events overnight. Alcohol withdrawal resolved. Hypertension also better, good tolerance to PO amlodipine. Patient is waiting for CD and SW for possible inpatient treatment spot.      Acute alcohol withdrawal  - Alcohol withdrawal resolved.   - clonidine for withdrawal symptoms  - gabapentin taper  - General diet  - Chemical dependency consult  - SW consult, he is interested on inpatient treatment options     Hypertension   - Continue monitoring BP. Continue on PO amlodipine. Hydralazine as needed.      Tachycardia  Casiano Parkinson White  - Note on EKG on admission, this is new  - Continue on telemetry  - Cardiology discussed with ED provider, EP referral at discharge  - Continue monitoring electrolytes      Hyponatremia  Anion gap metabolic acidosis  - Monitor      AST/ALT elevation  Presumed alcohol related hepatitis  - recommend alcohol cessation, continue monitoring LFT's         Diet: Combination Diet Regular Diet Adult  DVT Prophylaxis: Pneumatic Compression Devices and Anti-embolisim stockings (TEDs)  Banuelos Catheter: Not present  Lines: None     Cardiac Monitoring: None  Code Status: Full Code      Clinically Significant Risk Factors                              # Overweight: Estimated body mass index is 28.41 kg/m  as calculated from the following:    Height as of this encounter: 1.778 m (5' 10\").    Weight as of this encounter: 89.8 kg (198 lb)., PRESENT ON ADMISSION            Social Drivers of Health            Disposition " Plan     Medically Ready for Discharge: Anticipated in 2-4 Days             Kobe Lima MD  Hospitalist Service, GOLD TEAM 16  [facility]  Securely message with [employer] (more info)  Text page via [employer] Paging/Directory   See signed in provider for up to date coverage information  ______________________________________________________________________    Interval History     Acute events as above.   Patient was pleasant.   Family at bedside.   ROS negative    Physical Exam   Vital Signs: Temp: 98.7  F (37.1  C) Temp src: Oral BP: 120/84 Pulse: 74   Resp: 18 SpO2: 94 % O2 Device: None (Room air)    Weight: 198 lbs 0 oz    General Appearance: Alert, no acute distress, room air, oral mucosa moist  Respiratory: Normal respiratory effort, clear lungs, no crackles or wheezing  Cardiovascular: RRR, no murmur  GI: Abdomen soft, + BS, no tenderness to palpation  Other: Alert, oriented, no upper extremity tremors    Medical Decision Making       45 MINUTES SPENT BY ME on the date of service doing chart review, history, exam, documentation & further activities per the note.      Data     I have personally reviewed the following data over the past 24 hrs:    ALT: 73 (H) AST: 61 (H) AP: 57 TBILI: 0.7   ALB: 4.2 TOT PROTEIN: 6.7 LIPASE: N/A       Imaging results reviewed over the past 24 hrs:   No results found for this or any previous visit (from the past 24 hours).

## 2024-12-09 NOTE — PLAN OF CARE
"  Problem: Adult Inpatient Plan of Care  Goal: Plan of Care Review  Description: The Plan of Care Review/Shift note should be completed every shift.  The Outcome Evaluation is a brief statement about your assessment that the patient is improving, declining, or no change.  This information will be displayed automatically on your shift  note.  Outcome: Progressing  Flowsheets (Taken 12/9/2024 1501)  Outcome Evaluation: Discharge to [facility].  Plan of Care Reviewed With: patient  Overall Patient Progress: no change     Problem: Adult Inpatient Plan of Care  Goal: Plan of Care Review  Description: The Plan of Care Review/Shift note should be completed every shift.  The Outcome Evaluation is a brief statement about your assessment that the patient is improving, declining, or no change.  This information will be displayed automatically on your shift  note.  Outcome: Progressing  Flowsheets (Taken 12/9/2024 1507)  Outcome Evaluation: Discharge to [facility].  Plan of Care Reviewed With: patient  Overall Patient Progress: no change  Goal: Patient-Specific Goal (Individualized)  Description: You can add care plan individualizations to a care plan. Examples of Individualization might be:  \"Parent requests to be called daily at 9am for status\", \"I have a hard time hearing out of my right ear\", or \"Do not touch me to wake me up as it startles  me\".  Outcome: Progressing  Goal: Absence of Hospital-Acquired Illness or Injury  Outcome: Progressing  Goal: Optimal Comfort and Wellbeing  Outcome: Progressing  Goal: Readiness for Transition of Care  Outcome: Progressing     Problem: Alcohol Withdrawal  Goal: Alcohol Withdrawal Symptom Control  Outcome: Progressing     Problem: Comorbidity Management  Goal: Blood Pressure in Desired Range  Outcome: Progressing     "

## 2024-12-10 VITALS
RESPIRATION RATE: 16 BRPM | BODY MASS INDEX: 28.86 KG/M2 | OXYGEN SATURATION: 97 % | WEIGHT: 201.6 LBS | HEIGHT: 70 IN | HEART RATE: 83 BPM | TEMPERATURE: 98.8 F | DIASTOLIC BLOOD PRESSURE: 110 MMHG | SYSTOLIC BLOOD PRESSURE: 157 MMHG

## 2024-12-10 LAB
MAGNESIUM SERPL-MCNC: 2.2 MG/DL (ref 1.7–2.3)
PHOSPHATE SERPL-MCNC: 3.5 MG/DL (ref 2.5–4.5)
POTASSIUM SERPL-SCNC: 4.5 MMOL/L (ref 3.4–5.3)

## 2024-12-10 PROCEDURE — 250N000013 HC RX MED GY IP 250 OP 250 PS 637: Performed by: INTERNAL MEDICINE

## 2024-12-10 PROCEDURE — 36415 COLL VENOUS BLD VENIPUNCTURE: CPT | Performed by: PEDIATRICS

## 2024-12-10 PROCEDURE — 83735 ASSAY OF MAGNESIUM: CPT | Performed by: PEDIATRICS

## 2024-12-10 PROCEDURE — 99239 HOSP IP/OBS DSCHRG MGMT >30: CPT | Performed by: INTERNAL MEDICINE

## 2024-12-10 PROCEDURE — 84100 ASSAY OF PHOSPHORUS: CPT | Performed by: PEDIATRICS

## 2024-12-10 PROCEDURE — 84132 ASSAY OF SERUM POTASSIUM: CPT | Performed by: PEDIATRICS

## 2024-12-10 RX ORDER — LANOLIN ALCOHOL/MO/W.PET/CERES
100 CREAM (GRAM) TOPICAL DAILY
Qty: 30 TABLET | Refills: 0 | Status: SHIPPED | OUTPATIENT
Start: 2024-12-11 | End: 2025-01-10

## 2024-12-10 RX ORDER — AMLODIPINE BESYLATE 10 MG/1
10 TABLET ORAL DAILY
Qty: 30 TABLET | Refills: 0 | Status: SHIPPED | OUTPATIENT
Start: 2024-12-11 | End: 2025-01-10

## 2024-12-10 RX ORDER — FOLIC ACID 1 MG/1
1 TABLET ORAL DAILY
Qty: 30 TABLET | Refills: 0 | Status: SHIPPED | OUTPATIENT
Start: 2024-12-11 | End: 2025-01-10

## 2024-12-10 RX ORDER — MULTIPLE VITAMINS W/ MINERALS TAB 9MG-400MCG
1 TAB ORAL DAILY
Qty: 30 TABLET | Refills: 0 | Status: SHIPPED | OUTPATIENT
Start: 2024-12-11 | End: 2025-01-10

## 2024-12-10 RX ADMIN — THIAMINE HCL TAB 100 MG 100 MG: 100 TAB at 08:46

## 2024-12-10 RX ADMIN — FOLIC ACID 1 MG: 1 TABLET ORAL at 08:46

## 2024-12-10 RX ADMIN — GABAPENTIN 900 MG: 300 CAPSULE ORAL at 06:20

## 2024-12-10 RX ADMIN — GABAPENTIN 900 MG: 300 CAPSULE ORAL at 13:21

## 2024-12-10 RX ADMIN — CLONIDINE HYDROCHLORIDE 0.1 MG: 0.1 TABLET ORAL at 13:21

## 2024-12-10 RX ADMIN — Medication 1 TABLET: at 08:46

## 2024-12-10 RX ADMIN — CLONIDINE HYDROCHLORIDE 0.1 MG: 0.1 TABLET ORAL at 06:20

## 2024-12-10 RX ADMIN — AMLODIPINE BESYLATE 10 MG: 10 TABLET ORAL at 08:46

## 2024-12-10 ASSESSMENT — ACTIVITIES OF DAILY LIVING (ADL)
ADLS_ACUITY_SCORE: 40
ADLS_ACUITY_SCORE: 35
ADLS_ACUITY_SCORE: 40
ADLS_ACUITY_SCORE: 35

## 2024-12-10 NOTE — PLAN OF CARE
Problem: Adult Inpatient Plan of Care  Goal: Absence of Hospital-Acquired Illness or Injury  Intervention: Prevent Infection  Recent Flowsheet Documentation  Taken 12/10/2024 0937 by Austin Lawson, RN  Infection Prevention:   hand hygiene promoted   equipment surfaces disinfected     Problem: Comorbidity Management  Goal: Blood Pressure in Desired Range  Outcome: Not Progressing         VS:  Temp: 98.3  F (36.8  C) Temp src: Oral BP: (!) 157/98 Pulse: 90   Resp: 18 SpO2: 97 % O2 Device: None (Room air)     O2: SpO2 > 97 and stable on RA. LS clear and equal bilaterally. Denies chest pain and SOB.    Output: Voids spontaneously without difficulty to bathroom    Last BM: 12/09/2024, denies abdominal discomfort. BS active / passing flatus.    Activity:  independent   Skin: WDL except, scratches   Pain:  0/10    CMS: Intact, AOx4.    Dressing:  none   Diet: Regular diet. Denies nausea/vomiting.    LDA: L PIV SL    Equipment: IV pole, personal belongings, monitor   Plan: standard precautions maintained / Continue with plan of care. Call light within reach, pt able to make needs known.   Plan: CIWA   Additional Info:   3619 updated Dr HARRIS

## 2024-12-10 NOTE — DISCHARGE SUMMARY
"[facility]  Hospitalist Discharge Summary      Date of Admission:  12/6/2024  Date of Discharge:  12/10/2024  Discharging Provider: Song Robert DO  Discharge Service: Hospitalist Service, GOLD TEAM 16    Discharge Diagnoses   Acute alcohol withdrawal  Hypertension  Tachycardia  Reginaldo-Parkinson-White syndrome  Hyponatremia  Transaminase elevation    Clinically Significant Risk Factors     # Overweight: Estimated body mass index is 28.93 kg/m  as calculated from the following:    Height as of this encounter: 1.778 m (5' 10\").    Weight as of this encounter: 91.4 kg (201 lb 9.6 oz).       Follow-ups Needed After Discharge   Follow-up Appointments       Adult [facility] Follow-up and recommended labs and tests      Please follow-up with primary care provider at patient's for his convenience, specifically for blood pressure monitoring.  Please follow-up with [facility] as appropriate.    Appointments on [place] and/or [facility] (with [facility] or [facility] provider or service). Call [phone] if you haven't heard regarding these appointments within 7 days of discharge.            Please follow-up blood pressure control.    Unresulted Labs Ordered in the Past 30 Days of this Admission       No orders found from 11/6/2024 to 12/7/2024.            Discharge Disposition   Discharged to home  Condition at discharge: Stable    Hospital Course   39 year old male with alcohol use disorder admitted on 12/6/2024 for evaluation of alcohol withdrawal. Patient was started on CIWA protocol. Patient found with significant hypertension, no previous hx of hypertension. Also was found with abnormal LFT's secondary to alcohol abuse.     Acute alcohol withdrawal  - Alcohol withdrawal resolved.   - clonidine for withdrawal symptoms  - gabapentin taper  - General diet  - Chemical dependency consult  -  consult, he is interested on inpatient treatment options     Hypertension   - Continue " monitoring BP. Continue on PO amlodipine. Hydralazine as needed.      Tachycardia  Casiano Parkinson White  - Note on EKG on admission, this is new  - Continue on telemetry  - Cardiology discussed with ED provider, EP referral at discharge  - Continue monitoring electrolytes      Hyponatremia  Anion gap metabolic acidosis  - Monitor      AST/ALT elevation  Presumed alcohol related hepatitis  - recommend alcohol cessation, continue monitoring LFT's     Consultations This Hospital Stay   SOCIAL WORK IP CONSULT  CHEMICAL DEPENDENCY IP CONSULT    Code Status   Full Code    Time Spent on this Encounter   I, Song Robert DO, personally saw the patient today and spent greater than 30 minutes discharging this patient.       Song Robert DO, S  [facility] MED SURG  [facility] [address]  Phone: [phone]  Fax: [phone]  ______________________________________________________________________    Primary Care Physician   Physician No Ref-Primary    Discharge Orders      Reason for your hospital stay    Patient was admitted to the hospital for acute alcohol withdrawal syndrome.     Activity    Your activity upon discharge: activity as tolerated.  Please abstain from alcohol consumption.     Adult [facility] Follow-up and recommended labs and tests    Please follow-up with primary care provider at patient's for his convenience, specifically for blood pressure monitoring.  Please follow-up with [facility] as appropriate.    Appointments on [place] and/or [facility] (with [facility] or [facility] provider or service). Call [phone] if you haven't heard regarding these appointments within 7 days of discharge.     Diet    Follow this diet upon discharge: Current Diet:Orders Placed This Encounter      Combination Diet Regular Diet Adult       Significant Results and Procedures   Results for orders placed or performed in visit on 08/26/21   XR Wrist Left G/E 3 Views    Narrative    XR LEFT  WRIST THREE OR MORE VIEWS   8/26/2021 12:31 PM     HISTORY: Pain, swelling, no injury. Left wrist pain.    COMPARISON: None.      Impression    IMPRESSION: Normal joint spacing and alignment. No evidence of acute  fracture.    CHRIST MCCORMACK MD         SYSTEM ID:  [id number]       Discharge Medications   Current Discharge Medication List        START taking these medications    Details   amLODIPine (NORVASC) 10 MG tablet Take 1 tablet (10 mg) by mouth daily.  Qty: 30 tablet, Refills: 0    Associated Diagnoses: Hypertension, unspecified type      folic acid (FOLVITE) 1 MG tablet Take 1 tablet (1 mg) by mouth daily.  Qty: 30 tablet, Refills: 0    Associated Diagnoses: Alcohol withdrawal, uncomplicated (H)      multivitamin w/minerals (THERA-VIT-M) tablet Take 1 tablet by mouth daily.  Qty: 30 tablet, Refills: 0    Associated Diagnoses: Alcohol withdrawal, uncomplicated (H)      thiamine (B-1) 100 MG tablet Take 1 tablet (100 mg) by mouth daily.  Qty: 30 tablet, Refills: 0    Associated Diagnoses: Alcohol withdrawal, uncomplicated (H)           STOP taking these medications       acetaminophen (TYLENOL) 325 MG tablet Comments:   Reason for Stopping:         ondansetron (ZOFRAN) 4 MG tablet Comments:   Reason for Stopping:             Allergies   No Known Allergies

## 2024-12-10 NOTE — PLAN OF CARE
"[id number]    Goal Outcome Evaluation:     Plan of Care Reviewed With: patient     Overall Patient Progress: no change     Outcome Evaluation:     VS: Blood pressure (!) 165/105, pulse 76, temperature 98.7  F (37.1  C), temperature source Oral, resp. rate 17, height 1.778 m (5' 10\"), weight 89.8 kg (198 lb), SpO2 98%.   O2: SpO2 > 98% and stable on RA. LS clear and equal bilaterally. Denies chest pain and SOB.    Output: Voids spontaneously without difficulty to bathroom    Last BM: denies abdominal discomfort. BS active / passing flatus. Last Bowel Movement on 12/09/2024   Activity: Independent   Skin: WDL    Pain: Denies any pain or discomfort   CMS: Intact, AOx4. Denies numbness and tingling.   Dressing: NA   Diet: Regular diet. Denies nausea/vomiting.    LDA: L PIV SL    Equipment: IV pole, personal belongings,    Plan: Standard precautions maintained / Continue with plan of care. Call light within reach, pt able to make needs known.    Additional Info: CIWA score 1  RN managed magnesium 1.9  RN managed potassium 3.9  RN managed phosphorus 3.2         "

## 2024-12-10 NOTE — PLAN OF CARE
6MS DISCHARGE    D: Patient discharged to home at 1443.    I: Discharge prescriptions given to patient. All discharge medications and instructions reviewed with patient. Patient instructed to seek care if experiencing worsening symptoms, such as tremors, sweating. Other phone numbers to call with questions or concerns after discharge reviewed. L PIV removed. Education completed.    A: patient verbalized understanding of discharge medications and instructions. Prescribed home medications given to patient.  Belongings returned to patient.    P: Patient to follow-up on with primary.

## 2024-12-10 NOTE — PROGRESS NOTES
Care Management Discharge Note    Discharge Date: 12/10/2024       Discharge Disposition: Home and plan is to follow up with [facility] later this week    Discharge Services: [facility]    Discharge DME: None    Discharge Transportation:      Private pay costs discussed: Not applicable    Does the patient's insurance plan have a 3 day qualifying hospital stay waiver?  No    PAS Confirmation Code:  N/A  Patient/family educated on Medicare website which has current facility and service quality ratings: yes    Education Provided on the Discharge Plan: Yes  Persons Notified of Discharge Plans: Provider, bed side nurse, patient    Patient/Family in Agreement with the Plan:      Handoff Referral Completed: No, handoff not indicated or clinically appropriate    Additional Information:  Writer LENNY for [facility] admissions requesting a call back on the status of this referral. Writer spoke with Jayshree De La Torre in Admissions for [facility] who shared the plan. The discharge plan from [facility] is that this pt will discharge home from the hospital and then readmit to lodging later this week once the financial aid is determined with family. Pt and family are in agreement of this.       JACE Hughes   Coverin MS MELLISSA  Ph: [phone]  [facility]  Care Management Department    Securely message me on Vocera

## 2024-12-10 NOTE — PROGRESS NOTES
"Shift [id number]    Goal Outcome Evaluation:     Plan of Care Reviewed With: patient     Overall Patient Progress: no change     Outcome Evaluation:      VS: BP (!) 148/104 (BP Location: Right arm, Patient Position: Supine, Cuff Size: Adult Regular)   Pulse 72   Temp 98  F (36.7  C) (Oral)   Resp 18   Ht 1.778 m (5' 10\")   Wt 89.8 kg (198 lb)   SpO2 97%   BMI 28.41 kg/m     O2: Stable on RA. Denies chest pain and SOB.    Output: Voids spontaneously without difficulty to bathroom    Last BM: 12/09    Activity: Independent   Skin: WDL    Pain: Denies any pain or discomfort   CMS: Intact, AOx4. Denies numbness and tingling.   Diet: Regular diet. Denies nausea/vomiting.    LDA: L PIV SL    Equipment: IV pole, personal belongings, and call light in reach   Plan: Standard precautions maintained / Continue with plan of care. Call light within reach, pt able to make needs known.    Additional Info: CIWA scores 0 and 1. No acute events overnights.        "

## 2024-12-11 ENCOUNTER — TELEPHONE (OUTPATIENT)
Dept: BEHAVIORAL HEALTH | Facility: CLINIC | Age: 39
End: 2024-12-11
Payer: COMMERCIAL

## 2024-12-11 NOTE — TELEPHONE ENCOUNTER
----- Message from Samantha Ellis sent at 12/10/2024 11:43 AM CST -----  Regarding: check benefits  Please check benefits for LP.    Thanks,  Edgar

## 2024-12-19 ENCOUNTER — OFFICE VISIT (OUTPATIENT)
Dept: INTERNAL MEDICINE | Facility: CLINIC | Age: 39
End: 2024-12-19
Payer: COMMERCIAL

## 2024-12-19 VITALS
SYSTOLIC BLOOD PRESSURE: 144 MMHG | WEIGHT: 194.9 LBS | TEMPERATURE: 98.3 F | DIASTOLIC BLOOD PRESSURE: 102 MMHG | OXYGEN SATURATION: 98 % | BODY MASS INDEX: 27.97 KG/M2 | HEART RATE: 107 BPM

## 2024-12-19 DIAGNOSIS — Z13.220 ENCOUNTER FOR SCREENING FOR LIPID DISORDER: ICD-10-CM

## 2024-12-19 DIAGNOSIS — I10 ESSENTIAL HYPERTENSION: ICD-10-CM

## 2024-12-19 DIAGNOSIS — Z09 ENCOUNTER FOR EXAMINATION FOLLOWING TREATMENT AT HOSPITAL: Primary | ICD-10-CM

## 2024-12-19 DIAGNOSIS — F10.930 ALCOHOL WITHDRAWAL, UNCOMPLICATED (H): ICD-10-CM

## 2024-12-19 LAB
ALBUMIN SERPL BCG-MCNC: 4.5 G/DL (ref 3.5–5.2)
ALP SERPL-CCNC: 74 U/L (ref 40–150)
ALT SERPL W P-5'-P-CCNC: 65 U/L (ref 0–70)
ANION GAP SERPL CALCULATED.3IONS-SCNC: 15 MMOL/L (ref 7–15)
AST SERPL W P-5'-P-CCNC: 59 U/L (ref 0–45)
BILIRUB SERPL-MCNC: 0.4 MG/DL
BUN SERPL-MCNC: 9.5 MG/DL (ref 6–20)
CALCIUM SERPL-MCNC: 8.7 MG/DL (ref 8.8–10.4)
CHLORIDE SERPL-SCNC: 101 MMOL/L (ref 98–107)
CHOLEST SERPL-MCNC: 286 MG/DL
CREAT SERPL-MCNC: 0.98 MG/DL (ref 0.67–1.17)
EGFRCR SERPLBLD CKD-EPI 2021: >90 ML/MIN/1.73M2
FASTING STATUS PATIENT QL REPORTED: YES
FASTING STATUS PATIENT QL REPORTED: YES
GLUCOSE SERPL-MCNC: 94 MG/DL (ref 70–99)
HCO3 SERPL-SCNC: 24 MMOL/L (ref 22–29)
HDLC SERPL-MCNC: 41 MG/DL
LDLC SERPL CALC-MCNC: ABNORMAL MG/DL
LDLC SERPL DIRECT ASSAY-MCNC: 55 MG/DL
NONHDLC SERPL-MCNC: 245 MG/DL
POTASSIUM SERPL-SCNC: 3.9 MMOL/L (ref 3.4–5.3)
PROT SERPL-MCNC: 7.6 G/DL (ref 6.4–8.3)
SODIUM SERPL-SCNC: 140 MMOL/L (ref 135–145)
TRIGL SERPL-MCNC: 1028 MG/DL

## 2024-12-19 NOTE — PATIENT INSTRUCTIONS
- I will send you a message on [employer] when I am able to look at the results of your tests from today      Today we discussed your hypertension (high blood pressure). Goal blood pressure is an average of less than 130/80. Four important things to remember about your hypertension:    1) Take all medications as prescribed! Today we discussed your blood pressure medications and decided to continue them. No changes were made.    2) Lose weight! Losing weight is the best thing you can do to lower your blood pressure. Studies have found that for every 2 pounds you lose, your blood pressure will go down by 1 point. Ex: if you lose 10 pounds, your blood pressure should go down by 5 points. That's better than any medication, plus it will impact your health in a number of other positive ways. This may be a good time to make a weight loss goal.    3) Lower your sodium intake! Eating too much salt causes your body to hold onto extra water, which makes your blood pressure higher. Ditching the salt shaker is a good thing, but most of our sodium intake actually comes from pre-packaged foods. Read labels on cans and food packages. The labels tell you how much sodium is in each serving. Make sure that you look at the serving size. If you eat more than the serving size, you have eaten more sodium. Decreasing your sodium intake by more than 1,000mg per day can lower your blood pressure by up to 5 points and can be as effective as starting a blood pressure medication.    4) Check your blood pressure at home! You can buy a home monitor in a drugstore, supermarket pharmacy, or other large store. Not all automated blood pressure machines are created equal. You can find a list of validated blood pressure cuffs (meaning they have been confirmed to give accurate readings) by going to www.validatebp.org. That website does not sell blood pressure cuffs, but rather it lists the exact models that have been validated to be accurate. Wrist blood  pressure cuffs do not provide reliable comparisons to upper arm (brachial) cuffs. Be sure the arm cuff is the right size for your arm. Ask someone to measure around your upper arm. If your upper arm is more than 13 inches around, buy a monitor with a large cuff. To get a correct measurement, the cuff needs to be the right size.    It is important to measure your blood pressure periodically at home in between office visits. The readings you obtain during these blood pressure checks are often more valuable than the readings we obtain in clinic. However, it is even more important to check your blood pressure CORRECTLY at home. Follow these tips.    Check your blood pressure in the early morning (before you eat, drink, or take any medicines) and at one other random time of day. The random time of day does not need to be consistent from day to day.  Put the cuff on your arm. Remove clothes that get in the way of the cuff. Don t roll up your sleeve in a way that s tight around your arm. The cord should go toward your hand. Line it up with the middle of your forearm. The Velcro should attach easily on the cuff. If it doesn t reach, you may need a bigger cuff.  Wait 30 minutes if you have just eaten a lot, had a drink with caffeine or alcohol, used tobacco products, or exercised. Use the restroom if you need to. (Needing to go can raise your BP.)  Rest both feet flat on the floor with your back supported. Rest your arm at heart level on a table or the arm of a chair.  Sit quietly for 5 minutes or more before taking your blood pressure. Avoid talking while your blood pressure is being measured.  Start the monitor. Press the button or squeeze the ball to measure your blood pressure. Write down the time, the measurement, and your pulse.  Wait 2 minutes.  Repeat 2 more times.  Take the average of the readings. That's your blood pressure.    Lastly, bring your home monitor into the office for us to validate! Compare your blood  pressure monitor to the standardized method we use. It's a good idea to do this once per machine or if your machine starts giving you odd readings (suddenly much higher or lower than you're used to).

## 2024-12-19 NOTE — PROGRESS NOTES
Assessment & Plan   Encounter for examination following treatment at hospital  Alcohol withdrawal, uncomplicated (H)  Sober since hospitalization. Attending AA meetings daily. Congratulated him on sobriety and encouraged him to keep at it! Will re-check LFTs today. Work paperwork filled out today with patient and faxed while patient in clinic and he was given original back.  - Comprehensive metabolic panel; Future    Essential hypertension  BP >140/90, though patient reports he did not take amlodipine this morning. Discussed new guidelines that aim for <130/80. Discussed lifestyle interventions such as weight loss and reducing sodium intake in AVS. Encourage him to take amlodipine daily and to continue checking BP at home and to let me know via MyChart or f/u visit what those readings are in case we need to further titrate medication to obtain optimal BP control.     Encounter for screening for lipid disorder  Lab check today.  - Lipid panel reflex to direct LDL Non-fasting; Future    Signed Electronically by:  Sanjay Del Castillo MD, MPH  [facility]  Internal Medicine    Subjective   Bean is a 39 year old presenting for the following health issues: Hospital F/U  My only other visit with Bean was virtual.    [facility]   Hospital Follow-up Visit:  Hospital/Nursing Home/IP Rehab Facility: [facility]  Date of Admission: 12/6/24  Date of Discharge: 12/10/24  Reason(s) for Admission: Alcohol withdrawal, hypertension etc.   Was the patient in the ICU or did the patient experience delirium during hospitalization?  No  Do you have any other stressors you would like to discuss with your provider? No    Problems taking medications regularly:  None  Medication changes since discharge: None  Problems adhering to non-medication therapy:  None    Summary of hospitalization:  [facility] discharge summary reviewed  Diagnostic  "Tests/Treatments reviewed.  Follow up needed: LFTs  Other Healthcare Providers Involved in Patient s Care:          alcohol rehab  Update since discharge: improved.  Plan of care communicated with patient    I am seeing Bean today for follow-up on a recent hospitalization. The summary of the hospitalization from the relevant discharge summary is as follows:    \"39 year old male with alcohol use disorder admitted on 12/6/2024 for evaluation of alcohol withdrawal. Patient was started on CIWA protocol. Patient found with significant hypertension, no previous hx of hypertension. Also was found with abnormal LFT's secondary to alcohol abuse.     Acute alcohol withdrawal  - Alcohol withdrawal resolved.   - clonidine for withdrawal symptoms  - gabapentin taper  - General diet  - Chemical dependency consult  -  consult, he is interested on inpatient treatment options      Hypertension   - Continue monitoring BP. Continue on PO amlodipine. Hydralazine as needed.      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 He's going to AA meetings daily. Taking medications. Hoping to return to work next week, feels up to it without restrictions. Has paperwork for me to fill out.        Objective    BP (!) 144/102   Pulse 107   Temp 98.3  F (36.8  C) (Temporal)   Wt 88.4 kg (194 lb 14.4 oz)   SpO2 98%   BMI 27.97 kg/m    Body mass index is 27.97 kg/m .    Physical Exam   GENERAL: alert and in no distress.  EYES: conjunctivae/corneas clear. EOMs grossly intact  HENT: Facies symmetric.  RESP: CTAB, no w/r/r.  CV: RRR, no m/r/g.  MSK: Moves all " four extremities freely.  SKIN: No significant ulcers, lesions, or rashes on the visualized portions of the skin  NEURO: CN II-XII grossly intact.

## 2025-01-06 ENCOUNTER — TELEPHONE (OUTPATIENT)
Dept: INTERNAL MEDICINE | Facility: CLINIC | Age: 40
End: 2025-01-06
Payer: COMMERCIAL

## 2025-01-06 NOTE — TELEPHONE ENCOUNTER
Patient Returning Call    Reason for call:  was inquiring about receiving the fax from [employer] benefits regarding extending the disability till the end of Jauary    Information relayed to patient:  n/a    Patient has additional questions:  Yes    What are your questions/concerns:   Forms  Who does the patient want to speak with:    Nurse    Is an  needed?:  No      Could we send this information to you in [place] or would you prefer to receive a phone call?:   No preference   Okay to leave a detailed message?: Yes at Cell number on file:    Telephone Information:   Mobile [phone]

## 2025-01-07 ENCOUNTER — TELEPHONE (OUTPATIENT)
Dept: INTERNAL MEDICINE | Facility: CLINIC | Age: 40
End: 2025-01-07
Payer: COMMERCIAL

## 2025-01-07 NOTE — TELEPHONE ENCOUNTER
Forms/Letter Request    Type of form/letter: Disability      Is Release of Information needed?: No    Do we have the form/letter: Yes:     Who is the form from? Patient    Where did/will the form come from? Patient or family brought in       When is form/letter needed by: ASAP    How would you like the form/letter returned:     Patient Notified form requests are processed in 5-7 business days:Yes    Could we send this information to you in [email or web address] or would you prefer to receive a phone call?:   Patient would prefer a phone call   Okay to leave a detailed message?: Yes at Cell number on file:    Telephone Information:   Mobile [phone]

## 2025-01-08 NOTE — TELEPHONE ENCOUNTER
Per BL, get pt scheduled for a video apt ASAP to talk over forms okay to double book any slot.     Lvmtcb 1/8 at 4:40     Kassandra CORTEZ

## 2025-01-08 NOTE — TELEPHONE ENCOUNTER
1/8/25 . Pt asking for forms to be completed asap since this process has taken 2 weeks so far in getting the forms to the PCP. Once forms are completed then fax back [employer], Fax [phone]. Ph [phone], ref # [id number] .  Short term disability extension from 12.23.24 - 1.. Expected return back to work on 1.20.25

## 2025-01-09 ENCOUNTER — VIRTUAL VISIT (OUTPATIENT)
Dept: INTERNAL MEDICINE | Facility: CLINIC | Age: 40
End: 2025-01-09
Payer: COMMERCIAL

## 2025-01-09 DIAGNOSIS — F10.21 ALCOHOL DEPENDENCE IN EARLY FULL REMISSION (H): Primary | ICD-10-CM

## 2025-01-09 DIAGNOSIS — I10 ESSENTIAL HYPERTENSION: ICD-10-CM

## 2025-01-09 RX ORDER — LANOLIN ALCOHOL/MO/W.PET/CERES
100 CREAM (GRAM) TOPICAL DAILY
Qty: 90 TABLET | Refills: 3 | Status: SHIPPED | OUTPATIENT
Start: 2025-01-09

## 2025-01-09 RX ORDER — AMLODIPINE BESYLATE 10 MG/1
10 TABLET ORAL DAILY
Qty: 90 TABLET | Refills: 3 | Status: SHIPPED | OUTPATIENT
Start: 2025-01-09

## 2025-01-09 NOTE — TELEPHONE ENCOUNTER
Completed form faxed back to [employer] at [phone] (Fax number on form ).  Original form mailed to patients home address, copy made and sent to be scanned into chart.

## 2025-01-09 NOTE — PROGRESS NOTES
Bean is a 39 year old who is being evaluated via a billable video visit.    How would you like to obtain your AVS? MyChart  If the video visit is dropped, the invitation should be resent by: Text to cell phone: [phone]  Will anyone else be joining your video visit? No    Assessment & Plan   Alcohol dependence in early full remission (H)  Forms reviewed with patient during visit, completed, and given to [facility] for processing/faxing. Okay to discontinue folic acid as patient has maintained sobriety and eats a balanced diet. Continue thiamine for 1 year and then discontinue if he has continued to maintain sobriety at that point. Refill sent in.  - thiamine (B-1) 100 MG tablet; Take 1 tablet (100 mg) by mouth daily.    Essential hypertension  Refilled chronic medication at current dose.  - amLODIPine (NORVASC) 10 MG tablet; Take 1 tablet (10 mg) by mouth daily.    Signed Electronically by:  Sanjay Del Castillo MD, MPH  [facility]  Internal Medicine    The longitudinal plan of care for the diagnosis(es)/condition(s) as documented were addressed during this visit. Due to the added complexity in care, I will continue to support Bean in the subsequent management and with ongoing continuity of care.    Subjective   Bean is a 39 year old who presents for a same day acute care virtual video visit with chief concern of: Forms    History of Present Illness       Reason for visit:  Short term disabilty forms      Going to AA 5-7x per week. Needing short term disability extended. Has maintained sobriety. Wondering about refills of meds.      Objective       Vitals: No vitals were obtained today due to virtual visit.    Physical Exam   GENERAL: alert and no distress  EYES: Eyes grossly normal to inspection.  No discharge or erythema, or obvious scleral/conjunctival abnormalities.  RESP: No audible wheeze, cough, or visible cyanosis.    SKIN: Visible skin clear. No significant rash,  abnormal pigmentation or lesions.  NEURO: Cranial nerves grossly intact.  Mentation and speech appropriate for age.  PSYCH: Appropriate affect, tone, and pace of words    Video-Visit Details  Type of service: Video Visit   Originating Location (pt. Location): Home  Distant Location (provider location):  On-site  Platform used for Video Visit: AydeWell

## 2025-01-09 NOTE — TELEPHONE ENCOUNTER
Completed form faxed back to [place] [employer] at [phone] (Fax number on form ).  Original form mailed to patients home address, copy made and sent to be scanned into chart.

## 2025-01-14 ENCOUNTER — TELEPHONE (OUTPATIENT)
Dept: INTERNAL MEDICINE | Facility: CLINIC | Age: 40
End: 2025-01-14
Payer: COMMERCIAL

## 2025-01-14 NOTE — TELEPHONE ENCOUNTER
Forms/Letter Request    Type of form/letter: Disability      Is Release of Information needed?: No    Do we have the form/letter: Yes: In providers folder    Who is the form from? Insurance comp    Where did/will the form come from? form was faxed in    When is form/letter needed by:ASAP    How would you like the form/letter returned: Fax : [phone]    Patient Notified form requests are processed in 5-7 business days:No    Could we send this information to you in [employer] or would you prefer to receive a phone call?:   Patient would prefer a phone call   Okay to leave a detailed message?: Yes at Cell number on file:    Telephone Information:   Mobile [phone]

## 2025-01-22 ENCOUNTER — TELEPHONE (OUTPATIENT)
Dept: INTERNAL MEDICINE | Facility: CLINIC | Age: 40
End: 2025-01-22
Payer: COMMERCIAL

## 2025-01-22 NOTE — TELEPHONE ENCOUNTER
Completed form was faxed back to [facility] 1/15/2025.  Patient was advised.

## 2025-01-22 NOTE — TELEPHONE ENCOUNTER
Forms/Letter Request    Type of form/letter: OTHER: FMLA       Do we have the form/letter: Yes: In provider folder    Who is the form from? Insurance comp    Where did/will the form come from? form was faxed in    When is form/letter needed by: ASAP    How would you like the form/letter returned: Fax : [phone]    Patient Notified form requests are processed in 5-7 business days:No    Could we send this information to you in [employer] or would you prefer to receive a phone call?:

## 2025-01-27 ENCOUNTER — TELEPHONE (OUTPATIENT)
Dept: INTERNAL MEDICINE | Facility: CLINIC | Age: 40
End: 2025-01-27
Payer: COMMERCIAL

## 2025-01-27 NOTE — TELEPHONE ENCOUNTER
Forms/Letter Request    Type of form/letter: Disability       Do we have the form/letter: Yes: short term disability     Who is the form from? Patient    Where did/will the form come from? form was faxed in    When is form/letter needed by: asap    How would you like the form/letter returned:     Patient Notified form requests are processed in 5-7 business days:Yes    Could we send this information to you in [employer] or would you prefer to receive a phone call?:   Patient would prefer a phone call   Okay to leave a detailed message?: Yes at Home number on file [phone] (home)      *need return to work forms with physician notes and behavioral assessment printed for patient to pickup

## 2025-01-28 NOTE — TELEPHONE ENCOUNTER
Left message for patient to return call to clinic. Need to find out if these are new forms or the forms that were done on 1/22 that were faxed back in already. Please transfer to Darlene Huynh MA at [facility].

## 2025-01-29 NOTE — TELEPHONE ENCOUNTER
Made copies for both of the last forms we filled out and placed at  for pick for patient per his request. I also left voicemail for patient informing form was placed at  for  as he asked.

## 2025-02-05 ENCOUNTER — HOSPITAL ENCOUNTER (EMERGENCY)
Facility: CLINIC | Age: 40
Discharge: HOME | End: 2025-02-05
Attending: EMERGENCY MEDICINE | Admitting: EMERGENCY MEDICINE
Payer: COMMERCIAL

## 2025-02-05 ENCOUNTER — VIRTUAL VISIT (OUTPATIENT)
Dept: INTERNAL MEDICINE | Facility: CLINIC | Age: 40
End: 2025-02-05
Payer: COMMERCIAL

## 2025-02-05 VITALS
SYSTOLIC BLOOD PRESSURE: 167 MMHG | DIASTOLIC BLOOD PRESSURE: 102 MMHG | OXYGEN SATURATION: 100 % | TEMPERATURE: 98.1 F | HEART RATE: 109 BPM | RESPIRATION RATE: 20 BRPM

## 2025-02-05 DIAGNOSIS — F10.20 ALCOHOL USE DISORDER, MODERATE, DEPENDENCE (H): ICD-10-CM

## 2025-02-05 DIAGNOSIS — F10.20 ALCOHOL DEPENDENCE, CONTINUOUS (H): Primary | ICD-10-CM

## 2025-02-05 LAB
ALBUMIN SERPL BCG-MCNC: 4.9 G/DL (ref 3.5–5.2)
ALP SERPL-CCNC: 72 U/L (ref 40–150)
ALT SERPL W P-5'-P-CCNC: 34 U/L (ref 0–70)
AMPHETAMINES UR QL SCN: ABNORMAL
ANION GAP SERPL CALCULATED.3IONS-SCNC: 20 MMOL/L (ref 7–15)
AST SERPL W P-5'-P-CCNC: 51 U/L (ref 0–45)
ATRIAL RATE - MUSE: 103 BPM
BARBITURATES UR QL SCN: ABNORMAL
BASOPHILS # BLD AUTO: 0 10E3/UL (ref 0–0.2)
BASOPHILS NFR BLD AUTO: 0 %
BENZODIAZ UR QL SCN: ABNORMAL
BILIRUB SERPL-MCNC: 1.1 MG/DL
BUN SERPL-MCNC: 10.8 MG/DL (ref 6–20)
BZE UR QL SCN: ABNORMAL
CALCIUM SERPL-MCNC: 9.4 MG/DL (ref 8.8–10.4)
CANNABINOIDS UR QL SCN: ABNORMAL
CHLORIDE SERPL-SCNC: 93 MMOL/L (ref 98–107)
CREAT SERPL-MCNC: 0.74 MG/DL (ref 0.67–1.17)
DIASTOLIC BLOOD PRESSURE - MUSE: NORMAL MMHG
EGFRCR SERPLBLD CKD-EPI 2021: >90 ML/MIN/1.73M2
EOSINOPHIL # BLD AUTO: 0 10E3/UL (ref 0–0.7)
EOSINOPHIL NFR BLD AUTO: 0 %
ERYTHROCYTE [DISTWIDTH] IN BLOOD BY AUTOMATED COUNT: 11.9 % (ref 10–15)
ETHANOL SERPL-MCNC: 0.12 G/DL
FENTANYL UR QL: ABNORMAL
GLUCOSE SERPL-MCNC: 97 MG/DL (ref 70–99)
HCO3 SERPL-SCNC: 22 MMOL/L (ref 22–29)
HCT VFR BLD AUTO: 43.5 % (ref 40–53)
HGB BLD-MCNC: 16 G/DL (ref 13.3–17.7)
IMM GRANULOCYTES # BLD: 0 10E3/UL
IMM GRANULOCYTES NFR BLD: 0 %
INTERPRETATION ECG - MUSE: NORMAL
LYMPHOCYTES # BLD AUTO: 1.1 10E3/UL (ref 0.8–5.3)
LYMPHOCYTES NFR BLD AUTO: 16 %
MAGNESIUM SERPL-MCNC: 2 MG/DL (ref 1.7–2.3)
MCH RBC QN AUTO: 33.3 PG (ref 26.5–33)
MCHC RBC AUTO-ENTMCNC: 36.8 G/DL (ref 31.5–36.5)
MCV RBC AUTO: 91 FL (ref 78–100)
MONOCYTES # BLD AUTO: 0.6 10E3/UL (ref 0–1.3)
MONOCYTES NFR BLD AUTO: 8 %
NEUTROPHILS # BLD AUTO: 5.1 10E3/UL (ref 1.6–8.3)
NEUTROPHILS NFR BLD AUTO: 75 %
NRBC # BLD AUTO: 0 10E3/UL
NRBC BLD AUTO-RTO: 0 /100
OPIATES UR QL SCN: ABNORMAL
P AXIS - MUSE: 55 DEGREES
PCP QUAL URINE (ROCHE): ABNORMAL
PLATELET # BLD AUTO: 179 10E3/UL (ref 150–450)
POTASSIUM SERPL-SCNC: 3.3 MMOL/L (ref 3.4–5.3)
PR INTERVAL - MUSE: 132 MS
PROT SERPL-MCNC: 7.9 G/DL (ref 6.4–8.3)
QRS DURATION - MUSE: 92 MS
QT - MUSE: 368 MS
QTC - MUSE: 482 MS
R AXIS - MUSE: 29 DEGREES
RBC # BLD AUTO: 4.8 10E6/UL (ref 4.4–5.9)
SODIUM SERPL-SCNC: 135 MMOL/L (ref 135–145)
SYSTOLIC BLOOD PRESSURE - MUSE: NORMAL MMHG
T AXIS - MUSE: 20 DEGREES
VENTRICULAR RATE- MUSE: 103 BPM
WBC # BLD AUTO: 6.9 10E3/UL (ref 4–11)

## 2025-02-05 PROCEDURE — 96361 HYDRATE IV INFUSION ADD-ON: CPT

## 2025-02-05 PROCEDURE — 80307 DRUG TEST PRSMV CHEM ANLYZR: CPT | Performed by: EMERGENCY MEDICINE

## 2025-02-05 PROCEDURE — 258N000003 HC RX IP 258 OP 636: Performed by: EMERGENCY MEDICINE

## 2025-02-05 PROCEDURE — 99284 EMERGENCY DEPT VISIT MOD MDM: CPT | Mod: 25

## 2025-02-05 PROCEDURE — 82077 ASSAY SPEC XCP UR&BREATH IA: CPT | Performed by: EMERGENCY MEDICINE

## 2025-02-05 PROCEDURE — 36415 COLL VENOUS BLD VENIPUNCTURE: CPT | Performed by: EMERGENCY MEDICINE

## 2025-02-05 PROCEDURE — 96360 HYDRATION IV INFUSION INIT: CPT

## 2025-02-05 PROCEDURE — 93005 ELECTROCARDIOGRAM TRACING: CPT

## 2025-02-05 PROCEDURE — 98006 SYNCH AUDIO-VIDEO EST MOD 30: CPT | Performed by: INTERNAL MEDICINE

## 2025-02-05 PROCEDURE — 83735 ASSAY OF MAGNESIUM: CPT | Performed by: EMERGENCY MEDICINE

## 2025-02-05 PROCEDURE — 85004 AUTOMATED DIFF WBC COUNT: CPT | Performed by: EMERGENCY MEDICINE

## 2025-02-05 PROCEDURE — 85041 AUTOMATED RBC COUNT: CPT | Performed by: EMERGENCY MEDICINE

## 2025-02-05 PROCEDURE — 250N000013 HC RX MED GY IP 250 OP 250 PS 637: Performed by: EMERGENCY MEDICINE

## 2025-02-05 PROCEDURE — 84460 ALANINE AMINO (ALT) (SGPT): CPT | Performed by: EMERGENCY MEDICINE

## 2025-02-05 RX ORDER — TRAZODONE HYDROCHLORIDE 50 MG/1
50 TABLET ORAL
OUTPATIENT
Start: 2025-02-05

## 2025-02-05 RX ORDER — OLANZAPINE 5 MG/1
10 TABLET ORAL 3 TIMES DAILY PRN
OUTPATIENT
Start: 2025-02-05

## 2025-02-05 RX ORDER — OLANZAPINE 10 MG/2ML
10 INJECTION, POWDER, FOR SOLUTION INTRAMUSCULAR 3 TIMES DAILY PRN
OUTPATIENT
Start: 2025-02-05

## 2025-02-05 RX ORDER — ONDANSETRON 4 MG/1
4 TABLET, FILM COATED ORAL EVERY 6 HOURS PRN
OUTPATIENT
Start: 2025-02-05

## 2025-02-05 RX ORDER — MAGNESIUM HYDROXIDE/ALUMINUM HYDROXICE/SIMETHICONE 120; 1200; 1200 MG/30ML; MG/30ML; MG/30ML
30 SUSPENSION ORAL EVERY 4 HOURS PRN
OUTPATIENT
Start: 2025-02-05

## 2025-02-05 RX ORDER — FOLIC ACID 1 MG/1
1 TABLET ORAL ONCE
Status: COMPLETED | OUTPATIENT
Start: 2025-02-05 | End: 2025-02-05

## 2025-02-05 RX ORDER — LOPERAMIDE HYDROCHLORIDE 2 MG/1
2 CAPSULE ORAL 4 TIMES DAILY PRN
OUTPATIENT
Start: 2025-02-05

## 2025-02-05 RX ORDER — POTASSIUM CHLORIDE 1500 MG/1
40 TABLET, EXTENDED RELEASE ORAL ONCE
Status: COMPLETED | OUTPATIENT
Start: 2025-02-05 | End: 2025-02-05

## 2025-02-05 RX ORDER — MULTIPLE VITAMINS W/ MINERALS TAB 9MG-400MCG
1 TAB ORAL ONCE
Status: COMPLETED | OUTPATIENT
Start: 2025-02-05 | End: 2025-02-05

## 2025-02-05 RX ORDER — LORAZEPAM 1 MG/1
1 TABLET ORAL
Status: COMPLETED | OUTPATIENT
Start: 2025-02-05 | End: 2025-02-05

## 2025-02-05 RX ORDER — LORAZEPAM 1 MG/1
1 TABLET ORAL ONCE
Status: DISCONTINUED | OUTPATIENT
Start: 2025-02-05 | End: 2025-02-05

## 2025-02-05 RX ORDER — AMOXICILLIN 250 MG
1 CAPSULE ORAL 2 TIMES DAILY PRN
OUTPATIENT
Start: 2025-02-05

## 2025-02-05 RX ORDER — ACETAMINOPHEN 325 MG/1
650 TABLET ORAL EVERY 4 HOURS PRN
OUTPATIENT
Start: 2025-02-05

## 2025-02-05 RX ORDER — HYDROXYZINE HYDROCHLORIDE 25 MG/1
25 TABLET, FILM COATED ORAL EVERY 4 HOURS PRN
OUTPATIENT
Start: 2025-02-05

## 2025-02-05 RX ADMIN — THIAMINE HCL TAB 100 MG 100 MG: 100 TAB at 17:05

## 2025-02-05 RX ADMIN — SODIUM CHLORIDE 1000 ML: 9 INJECTION, SOLUTION INTRAVENOUS at 17:13

## 2025-02-05 RX ADMIN — LORAZEPAM 1 MG: 1 TABLET ORAL at 17:19

## 2025-02-05 RX ADMIN — POTASSIUM CHLORIDE 40 MEQ: 1500 TABLET, EXTENDED RELEASE ORAL at 19:06

## 2025-02-05 RX ADMIN — FOLIC ACID 1 MG: 1 TABLET ORAL at 17:05

## 2025-02-05 RX ADMIN — Medication 1 TABLET: at 17:05

## 2025-02-05 ASSESSMENT — LIFESTYLE VARIABLES
AUDITORY DISTURBANCES: NOT PRESENT
AGITATION: NORMAL ACTIVITY
ORIENTATION AND CLOUDING OF SENSORIUM: ORIENTED AND CAN DO SERIAL ADDITIONS
HEADACHE, FULLNESS IN HEAD: NOT PRESENT
VISUAL DISTURBANCES: NOT PRESENT
TOTAL SCORE: 3
NAUSEA AND VOMITING: NO NAUSEA AND NO VOMITING
PAROXYSMAL SWEATS: NO SWEAT VISIBLE
ANXIETY: MILDLY ANXIOUS
TREMOR: 2

## 2025-02-05 ASSESSMENT — COLUMBIA-SUICIDE SEVERITY RATING SCALE - C-SSRS
1. IN THE PAST MONTH, HAVE YOU WISHED YOU WERE DEAD OR WISHED YOU COULD GO TO SLEEP AND NOT WAKE UP?: NO
2. HAVE YOU ACTUALLY HAD ANY THOUGHTS OF KILLING YOURSELF IN THE PAST MONTH?: NO
6. HAVE YOU EVER DONE ANYTHING, STARTED TO DO ANYTHING, OR PREPARED TO DO ANYTHING TO END YOUR LIFE?: NO

## 2025-02-05 ASSESSMENT — ACTIVITIES OF DAILY LIVING (ADL)
ADLS_ACUITY_SCORE: 58

## 2025-02-05 NOTE — ED PROVIDER NOTES
Emergency Department Note      History of Present Illness     Chief Complaint   Alcohol Problem      HPI   Bean Huynh is a 39 year old male with a history of alcohol dependence, hypertension, and dyslipidemia who presents to the Emergency Department for an alcohol problem. Bean reports he was at inpatient detox at the [facility] two months ago for alcohol dependence and relapsed two weeks after his release. He last drank this morning and is looking to be medically cleared for detox.    Independent Historian   None    Review of External Notes   I reviewed the patient's virtual visit note from today (2/5/25) in which his provider suggested Bean return to detox.    Past Medical History     Medical History and Problem List   Alcohol dependence  Dyslipidemia  Hypertension     Medications   amLODIPine   Fenofibrate     Surgical History   [place] teeth extraction     Physical Exam     Patient Vitals for the past 24 hrs:   BP Temp Temp src Pulse Resp SpO2   02/05/25 1912 (!) 167/102 -- -- 109 20 100 %   02/05/25 1639 (!) 158/94 98.1  F (36.7  C) Temporal (!) 122 20 96 %     Physical Exam  General: Well-nourished,  appears to be resting comfortably when I enter the room  Eyes: PERRL, conjunctivae pink no scleral icterus or conjunctival injection  ENT:  Moist mucus membranes, posterior oropharynx clear without erythema or exudates  Respiratory:  Lungs clear to auscultation bilaterally, no crackles/rubs/wheezes.  Good air movement  CV: Mildly tachycardic rate and rhythm, no murmurs/rubs/gallops  GI:  Abdomen soft and non-distended.  Normoactive BS.  No tenderness, guarding or rebound  Skin: Warm, dry.  No rashes or petechiae  Musculoskeletal: No peripheral edema or calf tenderness  Neuro: Alert and oriented to person/place/time.  No tremulousness or tongue fasciculations.  Psychiatric: Normal affect.  No apparent hallucinations.  Calm and cooperative.      Diagnostics     Lab Results   Labs Ordered and Resulted  from Time of ED Arrival to Time of ED Departure   COMPREHENSIVE METABOLIC PANEL - Abnormal       Result Value    Sodium 135      Potassium 3.3 (*)     Carbon Dioxide (CO2) 22      Anion Gap 20 (*)     Urea Nitrogen 10.8      Creatinine 0.74      GFR Estimate >90      Calcium 9.4      Chloride 93 (*)     Glucose 97      Alkaline Phosphatase 72      AST 51 (*)     ALT 34      Protein Total 7.9      Albumin 4.9      Bilirubin Total 1.1     ETHYL ALCOHOL LEVEL - Abnormal    Alcohol ethyl 0.12 (*)    CBC WITH PLATELETS AND DIFFERENTIAL - Abnormal    WBC Count 6.9      RBC Count 4.80      Hemoglobin 16.0      Hematocrit 43.5      MCV 91      MCH 33.3 (*)     MCHC 36.8 (*)     RDW 11.9      Platelet Count 179      % Neutrophils 75      % Lymphocytes 16      % Monocytes 8      % Eosinophils 0      % Basophils 0      % Immature Granulocytes 0      NRBCs per 100 WBC 0      Absolute Neutrophils 5.1      Absolute Lymphocytes 1.1      Absolute Monocytes 0.6      Absolute Eosinophils 0.0      Absolute Basophils 0.0      Absolute Immature Granulocytes 0.0      Absolute NRBCs 0.0     URINE DRUG SCREEN PANEL - Abnormal    Amphetamines Urine Screen Negative      Barbituates Urine Screen Negative      Benzodiazepine Urine Screen Positive (*)     Cannabinoids Urine Screen Negative      Cocaine Urine Screen Negative      Fentanyl Qual Urine Screen Negative      Opiates Urine Screen Negative      PCP Urine Screen Negative     MAGNESIUM - Normal    Magnesium 2.0         Imaging   No orders to display     EKG   ECG results from 02/05/25   EKG 12-lead, tracing only     Value    Systolic Blood Pressure     Diastolic Blood Pressure     Ventricular Rate 103    Atrial Rate 103    NJ Interval 132    QRS Duration 92        QTc 482    P Axis 55    R AXIS 29    T Axis 20    Interpretation ECG      Sinus tachycardia  Otherwise normal ECG  When compared with ECG of 06-Dec-2024 14:48,  Reginaldo-Parkinson-White is no longer Present  Independent  interpretation done by me at 1850.      Independent Interpretation   None    ED Course      Medications Administered   Medications   multivitamin w/minerals (THERA-VIT-M) tablet 1 tablet (1 tablet Oral $Given 2/5/25 1705)   thiamine (B-1) tablet 100 mg (100 mg Oral $Given 2/5/25 1705)   folic acid (FOLVITE) tablet 1 mg (1 mg Oral $Given 2/5/25 1705)   sodium chloride 0.9% BOLUS 1,000 mL (0 mLs Intravenous Stopped 2/5/25 1907)   LORazepam (ATIVAN) tablet 1 mg (1 mg Oral $Given 2/5/25 1719)   potassium chloride guillermo ER (KLOR-CON M20) CR tablet 40 mEq (40 mEq Oral $Given 2/5/25 1906)       Procedures   None     Discussion of Management   None    ED Course   ED Course as of 02/06/25 0028   Wed Feb 05, 2025   1646 I evaluated the patient, obtained history, and performed a physical exam as detailed above.    2034 I rechecked the patient and updated them on the plan of care.        Additional Documentation  None    Medical Decision Making / Diagnosis     CMS Diagnoses: None    MIPS       None    MDM   Bean Huynh is a 39 year old male with a history of alcohol use disorder who came to the emergency department seeking detox.  At this time, the patient does not appear to be in severe alcohol withdrawal.  Laboratory studies were reassuring.  He has a very mildly elevated potassium and so potassium was repleted.  He was treated with IV fluids as well as a multivitamin and thiamine.  We were able to find a bed for him at [facility] after he was medically cleared, however, apparently private car transport is not acceptable.  The patient initially was willing to stay for ambulance transport but then decided that he would like to be discharged instead.  The patient has capacity to make this decision and is not on a hold.  A sober adult, his significant other, came to pick him up.  He is welcome to return if he changes his mind and encouraged to return if he becomes worse in any way.    Disposition   The patient was  discharged.    Diagnosis     ICD-10-CM    1. Alcohol use disorder, moderate, dependence (H)  F10.20            Discharge Medications   Discharge Medication List as of 2/5/2025 10:37 PM        Scribe Disclosure:  I, Jacki Little, am serving as a scribe at 5:31 PM on 2/5/2025 to document services personally performed by Stacey Bowens MD based on my observations and the provider's statements to me.        Stacey Bowens MD  02/06/25 0030

## 2025-02-05 NOTE — ED TRIAGE NOTES
Patient presents to the ER for complaints of alcohol withdrawal. Patient has a history of drinking, 1/2 liter of vodka a day for the last several days. Last drink was at 0300. No history of withdrawal seizures. Wants to go to Detox      Triage Assessment (Adult)       Row Name 02/05/25 0710          Triage Assessment    Airway WDL WDL        Respiratory WDL    Respiratory WDL WDL        Skin Circulation/Temperature WDL    Skin Circulation/Temperature WDL WDL        Cardiac WDL    Cardiac WDL X        Peripheral/Neurovascular WDL    Peripheral Neurovascular WDL WDL        Cognitive/Neuro/Behavioral WDL    Cognitive/Neuro/Behavioral WDL WDL

## 2025-02-06 NOTE — DISCHARGE INSTRUCTIONS
*You should drink less alcohol.  *No new medications. Continue your current medications.  *Follow-up with your doctor for a recheck in 2-3 days.  *Return if you change your mind and wish to stop drinking, develop fever, withdrawal symptoms, vomiting, faint or feel like you will faint or become worse in any way.    Discharge Instructions  Alcohol Intoxication    You have been seen today with alcohol intoxication. This means that you have enough alcohol in your system to impair your ability to mentally and physically function. When you are intoxicated, we are not allowed to release you without a sober adult to be with you. You may not drive, operate dangerous equipment, or do anything else dangerous until you are sober.    You may have come to the Emergency Department because of your intoxication, or for another reason, such as because of an injury. No matter what the case is, this visit is a  red flag  regarding alcohol use, and you should consider whether your drinking pattern is a problem for you.     You may be at risk for alcohol-related problems if:    Men: you drink more than 14 drinks per week, or more than 4 drinks per occasion.    Women: you drink more than 7 drinks per week or more than 3 drinks per occasion.    You have black-outs.  You do things you regret while drinking.  You have legal problems because of drinking (DUI).  You have job problems because of drinking (you call in sick to work because of drinking).    CAGE Questions  Have you ever felt you should cut down on your drinking?  Have people annoyed you by criticizing your drinking?  Have you ever felt bad or guilty about your drinking?  Have you ever had a drink first thing in the morning to steady your nerves or get rid of a hangover (eye opener)?    If you answer yes to any of the CAGE questions, you may have a problem with alcohol.      Return to the Emergency Department if:  You become shaky or tremble when you try to stop drinking.    You  have a seizure or pass out.    You throw up (vomit) blood. This may be bright red or it may look like black coffee grounds.    You have blood in the stool. This may be bright red or appear as a black, tarry, bad smelling stool.    You become lightheaded or faint.      For further help, contact:   Your caregiver.    Alcoholics Anonymous (AA).    A drug or alcohol rehabilitation program.    You can get information on alcohol resources and groups by calling the number 284 or 1-172.769.6724 on any phone.       Seek medical care if:  You have persistent vomiting.    You have persistent pain in any part of your body.    You do not feel better after a few days.    If you were given a prescription for medicine here today, be sure to read all of the information (including the package insert) that comes with your prescription.  This will include important information about the medicine, its side effects, and any warnings that you need to know about.  The pharmacist who fills the prescription can provide more information and answer questions you may have about the medicine.  If you have questions or concerns that the pharmacist cannot address, please call or return to the Emergency Department.   Remember that you can always come back to the Emergency Department if you are not able to see your regular doctor in the amount of time listed above, if you get any new symptoms, or if there is anything that worries you.

## 2025-02-07 PROBLEM — F10.20 ALCOHOL DEPENDENCE, CONTINUOUS (H): Status: ACTIVE | Noted: 2025-02-07

## 2025-04-02 DIAGNOSIS — F10.930 ALCOHOL WITHDRAWAL SYNDROME WITHOUT COMPLICATION (H): ICD-10-CM

## 2025-04-02 DIAGNOSIS — I10 ESSENTIAL HYPERTENSION: ICD-10-CM

## 2025-04-02 RX ORDER — CLONIDINE HYDROCHLORIDE 0.1 MG/1
TABLET ORAL
Qty: 30 TABLET | Refills: 2 | Status: SHIPPED | OUTPATIENT
Start: 2025-04-02